# Patient Record
Sex: FEMALE | Race: WHITE | NOT HISPANIC OR LATINO | Employment: UNEMPLOYED | URBAN - METROPOLITAN AREA
[De-identification: names, ages, dates, MRNs, and addresses within clinical notes are randomized per-mention and may not be internally consistent; named-entity substitution may affect disease eponyms.]

---

## 2021-06-15 ENCOUNTER — HOSPITAL ENCOUNTER (EMERGENCY)
Facility: MEDICAL CENTER | Age: 46
End: 2021-06-15
Attending: EMERGENCY MEDICINE
Payer: MEDICAID

## 2021-06-15 VITALS
WEIGHT: 142.42 LBS | OXYGEN SATURATION: 97 % | RESPIRATION RATE: 19 BRPM | HEIGHT: 64 IN | SYSTOLIC BLOOD PRESSURE: 121 MMHG | TEMPERATURE: 99.4 F | DIASTOLIC BLOOD PRESSURE: 79 MMHG | BODY MASS INDEX: 24.31 KG/M2 | HEART RATE: 88 BPM

## 2021-06-15 DIAGNOSIS — R11.0 NAUSEA: ICD-10-CM

## 2021-06-15 DIAGNOSIS — K04.7 DENTAL INFECTION: ICD-10-CM

## 2021-06-15 PROCEDURE — 99281 EMR DPT VST MAYX REQ PHY/QHP: CPT

## 2021-06-15 RX ORDER — AMOXICILLIN 500 MG/1
500 CAPSULE ORAL 3 TIMES DAILY
Qty: 21 CAPSULE | Refills: 0 | Status: SHIPPED | OUTPATIENT
Start: 2021-06-15 | End: 2021-06-22

## 2021-06-15 RX ORDER — ONDANSETRON 4 MG/1
4 TABLET, ORALLY DISINTEGRATING ORAL EVERY 6 HOURS PRN
Qty: 10 TABLET | Refills: 0 | Status: SHIPPED | OUTPATIENT
Start: 2021-06-15 | End: 2021-06-25

## 2021-06-15 RX ORDER — ONDANSETRON 2 MG/ML
4 INJECTION INTRAMUSCULAR; INTRAVENOUS ONCE
Status: DISCONTINUED | OUTPATIENT
Start: 2021-06-15 | End: 2021-06-15 | Stop reason: HOSPADM

## 2021-06-15 ASSESSMENT — LIFESTYLE VARIABLES
DO YOU DRINK ALCOHOL: YES
HAVE YOU EVER FELT YOU SHOULD CUT DOWN ON YOUR DRINKING: NO

## 2021-06-15 NOTE — ED TRIAGE NOTES
"Nauseated Feels unwell Poss fever but not documented  \" pus pockets on gums \" Can't get into dentist till Oct 2021  "

## 2021-06-15 NOTE — DISCHARGE INSTRUCTIONS
Please follow-up with the community clinic listed above.  Call tomorrow morning to schedule follow-up appointment for a wellness check, and complete primary care visit.  Additionally, you may call them to see if they have an available dentist appointment, or follow-up with a dentist with whom he previously scheduled appointment.  Return to the emergency department if you develop any new or worsening symptoms including facial swelling, difficulty swallowing, shortness of breath, worsening nausea or vomiting, fevers, vomiting, or any further concerns.

## 2021-06-15 NOTE — ED PROVIDER NOTES
ED Provider Note    Chief Complaint:   Nausea, dental pain, upset stomach    HPI:  Ivette Jett is a very pleasant 46-year-old woman who presents to the emergency department for evaluation of dental pain and nausea.  She reports for the past year she has had intermittent pains due to poor dentition, she has multiple broken teeth, as well as teeth are full of dental caries.  Over the past 24 to 48 hours she has noticed some swelling to one of the right upper molars, as well as one of the left lower molars, she also reports some purulent drainage previously.  Swelling has improved since spontaneous drainage.  Over the past 24 to 48 hours she has felt warm, describes possible subjective fevers but did not check her temperature.  She is also had nausea without vomiting, as well as associated loss of appetite.  She does have some abdominal discomfort localized to the epigastric area, though no significant epigastric pain.  She has no chest pain, no shortness of breath, no lower abdominal pain.  She reports over the past few months her menstrual periods have gotten heavier on review of systems, with no acute changes today.  She has no dysuria, no hematuria.  She is unable to identify any exacerbating or alleviating factors.  She reports no associated facial swelling, no difficulty opening or closing the jaw.  No associated headaches or vision changes.    Review of Systems:  See HPI for pertinent positives and negatives. All other systems negative.    Past Medical History:       Social History:  Social History     Tobacco Use   • Smoking status: Current Every Day Smoker     Types: Cigarettes   • Smokeless tobacco: Never Used   Vaping Use   • Vaping Use: Never used   Substance and Sexual Activity   • Alcohol use: Not Currently     Comment: rehabing alcoholic   • Drug use: Not Currently   • Sexual activity: Not on file       Surgical History:  patient denies any surgical history    Current Medications:  Home Medications   "  **Home medications have not yet been reviewed for this encounter**         Allergies:  No Known Allergies    Physical Exam:  Vital Signs: /79   Pulse 88   Temp 37.4 °C (99.4 °F) (Temporal)   Resp 19   Ht 1.626 m (5' 4\")   Wt 64.6 kg (142 lb 6.7 oz)   LMP 05/15/2021 (Exact Date)   SpO2 97%   BMI 24.45 kg/m²   Constitutional: Alert, no acute distress  HENT: Atraumatic, no facial swelling, no trismus, right upper molar is cracked with surrounding gingival inflammation and small area of prior drainage, no active drainage or fluctuance on my exam, similar area of inflammation surrounding one of the left lower molars.  No visible nor palpable abscesses amenable to drainage on this exam.  Eyes: Pupils equal and reactive, normal conjunctiva  Neck: Supple, normal range of motion, no stridor  Cardiovascular: Extremities are warm and well perfused, no murmur appreciated, normal cardiac auscultation  Pulmonary: No respiratory distress, normal work of breathing, no accessory muscule usage, breath sounds clear and equal bilaterally  Abdomen: Soft, non-distended, non-tender to palpation, no peritoneal signs, epigastric area is nontender to palpation, no lower abdominal tenderness to palpation, no palpable masses.  Skin: Warm, dry, no rashes or lesions  Musculoskeletal: Normal range of motion in all extremities, no swelling or deformity noted  Neurologic: Alert, oriented, normal speech, normal motor function  Psychiatric: Normal and appropriate mood and affect    Medical records reviewed for continuity of care.  No prior medical records available for review.    Labs:  Labs Reviewed   CBC WITH DIFFERENTIAL   COMP METABOLIC PANEL   HCG QUAL SERUM   URINALYSIS   LIPASE       Radiology:  No orders to display        ED Medications Administered:  Medications   ondansetron (ZOFRAN) syringe/vial injection 4 mg (has no administration in time range)       Differential diagnosis:  Dental pain, odontogenic abscess, " pancreatitis, hyperglycemia    MDM:  Ms. Jett presents to the emergency department today for evaluation of dental pain, nausea, and loss of appetite.  She does report some abdominal discomfort in the epigastric area, though has no significant tenderness to palpation, nor peritoneal signs on my physical exam.  She has no evidence of airway compromise, she is easily tolerating secretions, no trismus, no facial swelling.  No evidence of facial cellulitis or abscess.    My plan was for lab work, as well as antibiotic treatment for likely dental infection, and Zofran for nausea.  However when I went to ask her about her pharmacy of choice, she was walking out of the emergency department.  She did not wait to speak to me, did not provide much notification nursing staff that she was leaving.  Unfortunately, she did not wait for discharge instructions, or an outpatient follow-up plan.    Personal protective equipment including N95 surgical respirator, goggles, and gloves were used during this encounter.       Disposition:  Eloped without notifying staff, nor waiting for MD discussion    Final Impression:  1. Dental infection    2. Nausea        Electronically signed by: Aniya Blandon MD, 6/15/2021 2:09 PM

## 2021-07-05 ENCOUNTER — HOSPITAL ENCOUNTER (EMERGENCY)
Facility: MEDICAL CENTER | Age: 46
End: 2021-07-05
Attending: EMERGENCY MEDICINE
Payer: MEDICAID

## 2021-07-05 ENCOUNTER — APPOINTMENT (OUTPATIENT)
Dept: RADIOLOGY | Facility: MEDICAL CENTER | Age: 46
End: 2021-07-05
Attending: EMERGENCY MEDICINE
Payer: MEDICAID

## 2021-07-05 VITALS
HEART RATE: 60 BPM | TEMPERATURE: 99.1 F | BODY MASS INDEX: 23.73 KG/M2 | HEIGHT: 65 IN | DIASTOLIC BLOOD PRESSURE: 66 MMHG | OXYGEN SATURATION: 100 % | RESPIRATION RATE: 16 BRPM | WEIGHT: 142.42 LBS | SYSTOLIC BLOOD PRESSURE: 127 MMHG

## 2021-07-05 DIAGNOSIS — N93.9 VAGINAL BLEEDING: ICD-10-CM

## 2021-07-05 LAB
APPEARANCE UR: CLEAR
BILIRUB UR QL STRIP.AUTO: NEGATIVE
COLOR UR: YELLOW
GLUCOSE UR STRIP.AUTO-MCNC: NEGATIVE MG/DL
HCG UR QL: NEGATIVE
KETONES UR STRIP.AUTO-MCNC: NEGATIVE MG/DL
LEUKOCYTE ESTERASE UR QL STRIP.AUTO: NEGATIVE
MICRO URNS: NORMAL
NITRITE UR QL STRIP.AUTO: NEGATIVE
PH UR STRIP.AUTO: 7 [PH] (ref 5–8)
PROT UR QL STRIP: NEGATIVE MG/DL
RBC UR QL AUTO: NEGATIVE
SP GR UR STRIP.AUTO: 1.01

## 2021-07-05 PROCEDURE — 99284 EMERGENCY DEPT VISIT MOD MDM: CPT

## 2021-07-05 PROCEDURE — 81025 URINE PREGNANCY TEST: CPT

## 2021-07-05 PROCEDURE — 81003 URINALYSIS AUTO W/O SCOPE: CPT

## 2021-07-05 PROCEDURE — 76856 US EXAM PELVIC COMPLETE: CPT

## 2021-07-05 RX ORDER — FLUOXETINE HYDROCHLORIDE 20 MG/1
20 CAPSULE ORAL DAILY
Status: SHIPPED | COMMUNITY
End: 2023-02-27

## 2021-07-05 RX ORDER — IBUPROFEN 200 MG
400 TABLET ORAL EVERY 6 HOURS PRN
Status: SHIPPED | COMMUNITY
End: 2022-11-15

## 2021-07-05 NOTE — ED NOTES
Med rec updated and complete  Allergies reviewed.  Pt reports no antibiotics in the last 30 days.      No current facility-administered medications on file prior to encounter.     Current Outpatient Medications on File Prior to Encounter   Medication Sig Dispense Refill   • FLUoxetine (PROZAC) 20 MG Cap Take 20 mg by mouth every day.     • Phenazopyridine HCl (AZO URINARY PAIN PO) Take 2 Tablets by mouth as needed (For urinary pain). For urinary pain     • multivitamin (THERAGRAN) Tab Take 1 tablet by mouth every 72 hours.     • ibuprofen (MOTRIN) 200 MG Tab Take 400 mg by mouth every 6 hours as needed for Mild Pain.

## 2021-07-05 NOTE — ED NOTES
Urine sent to lab in triage, pt co some UTI symptoms when bringing urine back from bathroom. Pt also stated she recently was released from assisted and has been unable to find healthcare yet.

## 2021-07-05 NOTE — ED TRIAGE NOTES
"Chief Complaint   Patient presents with   • Vaginal Bleeding     c/o heavier then usual bleeding and clots, fatigue, \"feverish\"; pt states she is on her monthly cycle at this time      /85   Pulse 71   Temp 37.3 °C (99.1 °F) (Temporal)   Resp 16   Ht 1.651 m (5' 5\")   Wt 64.6 kg (142 lb 6.7 oz)   SpO2 99%   BMI 23.70 kg/m²     Pt arrived w/ above concern, urine sample obtained in triage    "

## 2021-07-05 NOTE — PROGRESS NOTES
"PT REFUSING THE VAGINAL PORTION OF THE ULTRASOUND PELVIC COMPLETE, SINCE SHE SAYS SHE IS \"HEAVILY BLEEDING AND DOESN'T WANT ANYTHING UP THERE RIGHT NOW.\" RN AND PHYSICIAN AWARE. ERP SAID OK TO CHANGE TO THE TRANSABDOMINAL ONLY APPROACH. PT JUST EMPTIED BLADDER AND WILL BEGIN DRINKING FLUIDS TO FILL BLADDER AGAIN. INSTRUCTIONS GIVEN TO PT TO NOTIFY RN WHEN PT FEELS LIKE SHE REALLY HAS TO PEE, RN WILL NOTIFY ME AND WE CAN FINISH THIS EXAM.   -YOSELYN (EXT [id number])  "

## 2021-07-05 NOTE — ED PROVIDER NOTES
"ED Provider Note    CHIEF COMPLAINT  Chief Complaint   Patient presents with   • Vaginal Bleeding     c/o heavier then usual bleeding and clots, fatigue, \"feverish\"; pt states she is on her monthly cycle at this time        HPI  Ivette Jett is a 46 y.o. female here for evaluation of vaginal bleeding.  Patient states that she has her normal cycle, but her bleeding over the last couple of years is \"heavier\" than it has been.  She states that she has no fever chills, but just has felt achy over the last few days.  She has no chest pain, shortness breath, lightheadedness, or dizziness.  States she has not seen OB/GYN for the same.  She recently was incarcerated and was released.  She does have some history of urinary complaints such as frequency.  She has been taking Azo for the same.  Patient has no abdominal pain.    ROS;  Please see HPI  O/W negative     PAST MEDICAL HISTORY   No bleeding disorders    SOCIAL HISTORY  Social History     Tobacco Use   • Smoking status: Current Every Day Smoker     Types: Cigarettes   • Smokeless tobacco: Never Used   Vaping Use   • Vaping Use: Never used   Substance and Sexual Activity   • Alcohol use: Not Currently     Comment: rehabing alcoholic   • Drug use: Not Currently   • Sexual activity: Not on file       SURGICAL HISTORY  patient denies any surgical history    CURRENT MEDICATIONS  Home Medications    **Home medications have not yet been reviewed for this encounter**         ALLERGIES  No Known Allergies    REVIEW OF SYSTEMS  See HPI for further details. Review of systems as above, otherwise all other systems are negative.     PHYSICAL EXAM  VITAL SIGNS: /85   Pulse 71   Temp 37.3 °C (99.1 °F) (Temporal)   Resp 16   Ht 1.651 m (5' 5\")   Wt 64.6 kg (142 lb 6.7 oz)   SpO2 99%   BMI 23.70 kg/m²     Constitutional: Well developed, well nourished. No acute distress.  HEENT: Normocephalic, atraumatic. MMM  Neck: Supple, Full range of motion "   Chest/Pulmonary:  No respiratory distress.  Equal expansion   Musculoskeletal: No deformity, no edema, neurovascular intact.   Neuro: Clear speech, appropriate, cooperative, cranial nerves II-XII grossly intact.  Psych: Normal mood and affect        Results for orders placed or performed during the hospital encounter of 07/05/21   URINALYSIS CULTURE, IF INDICATED    Specimen: Urine   Result Value Ref Range    Color Yellow     Character Clear     Specific Gravity 1.015 <1.035    Ph 7.0 5.0 - 8.0    Glucose Negative Negative mg/dL    Ketones Negative Negative mg/dL    Protein Negative Negative mg/dL    Bilirubin Negative Negative    Nitrite Negative Negative    Leukocyte Esterase Negative Negative    Occult Blood Negative Negative    Micro Urine Req see below    HCG QUALITATIVE UR   Result Value Ref Range    Beta-Hcg Urine Negative Negative     US-PELVIC TRANSABDOMINAL ONLY   Final Result      1.  Small pocket of fluid in the fundal endometrial cavity. Early gestational sac is unlikely but possible. Please correlate with pregnancy test.   2.  The uterus is otherwise unremarkable.   3.  Normal right ovary. Nonvisualization of the left ovary.              PROCEDURES     MEDICAL RECORD  I have reviewed patient's medical record and pertinent results are listed.    COURSE & MEDICAL DECISION MAKING  I have reviewed any medical record information, laboratory studies and radiographic results as noted above.    Pt has declined the vaginal probe for u/s.  She understands we will not be able to fully evaluate her without this.     She has a reading of a small area of fluid, but her pregnancy test is negative.  She is non toxic appearing, comfortable, and will follow up.  She has  Her normal menses now, and her flow has been 'heavier' over the last 2-3 years.  This is not a new finding.     I you have had any blood pressure issues while here in the emergency department, please see your doctor for a further evaluation or work  up.    Differential diagnoses include but not limited to: normal menses, dub, pregnancy.     This patient presents with vaginal bleeding .  At this time, I have counseled the patient/family regarding their medications, pain control, and follow up.  They will continue their medications, if any, as prescribed.  They will return immediately for any worsening symptoms and/or any other medical concerns.  They will see their doctor, or contact the doctor provided, in 1-2 days for follow up.       FINAL IMPRESSION  1. Vaginal bleeding             Electronically signed by: Oleg Justice D.O., 7/5/2021 10:40 AM

## 2022-07-14 ENCOUNTER — OFFICE VISIT (OUTPATIENT)
Dept: URGENT CARE | Facility: CLINIC | Age: 47
End: 2022-07-14
Payer: MEDICAID

## 2022-07-14 VITALS
TEMPERATURE: 98 F | HEIGHT: 65 IN | WEIGHT: 123 LBS | OXYGEN SATURATION: 100 % | RESPIRATION RATE: 18 BRPM | BODY MASS INDEX: 20.49 KG/M2 | HEART RATE: 90 BPM | SYSTOLIC BLOOD PRESSURE: 130 MMHG | DIASTOLIC BLOOD PRESSURE: 70 MMHG

## 2022-07-14 DIAGNOSIS — K04.7 DENTAL INFECTION: ICD-10-CM

## 2022-07-14 DIAGNOSIS — R11.0 NAUSEA: ICD-10-CM

## 2022-07-14 DIAGNOSIS — R10.9 ABDOMINAL DISCOMFORT: ICD-10-CM

## 2022-07-14 LAB

## 2022-07-14 PROCEDURE — 81025 URINE PREGNANCY TEST: CPT | Performed by: PHYSICIAN ASSISTANT

## 2022-07-14 PROCEDURE — 81002 URINALYSIS NONAUTO W/O SCOPE: CPT | Performed by: PHYSICIAN ASSISTANT

## 2022-07-14 PROCEDURE — 99203 OFFICE O/P NEW LOW 30 MIN: CPT | Performed by: PHYSICIAN ASSISTANT

## 2022-07-14 RX ORDER — AMOXICILLIN AND CLAVULANATE POTASSIUM 875; 125 MG/1; MG/1
1 TABLET, FILM COATED ORAL 2 TIMES DAILY
Qty: 14 TABLET | Refills: 0 | Status: SHIPPED | OUTPATIENT
Start: 2022-07-14 | End: 2022-07-21

## 2022-07-14 ASSESSMENT — ENCOUNTER SYMPTOMS
SHORTNESS OF BREATH: 0
COUGH: 1
EYE REDNESS: 0
FEVER: 0
ABDOMINAL PAIN: 1
SORE THROAT: 0
DIARRHEA: 1
VOMITING: 0
EYE DISCHARGE: 0
NAUSEA: 1

## 2022-07-14 NOTE — PROGRESS NOTES
"Subjective     Ivette Jett is a 47 y.o. female who presents with Cough (Nasal congestion/lower abdominal discomfort/teeth pain/x 1 month)        This is a new problem.  The patient presents to clinic with multiple complaints.  The patient states she has been experiencing intermittent lower abdominal discomfort x1.5 months.  The patient states her lower abdomen feels \"swollen and bloated\" at times.  The patient states this is currently improved.  The patient states that she is often nauseous in the morning, stating she \"feels sick\".  The patient reports no associated vomiting.  The patient states that she is experiencing some loose stools, but reports no diarrhea.  The patient states that she has a daily bowel movement.  Patient reports no associated bloody stools.  The patient states it feels like she may have a UTI.  The patient reports no associated dysuria.  She also reports no hematuria.  The patient denies abnormal vaginal discharge or a vaginal odor.  The patient admits to increased alcohol consumption.  The patient states that she is drinking a pint of whiskey every day.  The patient states she is also experiencing ongoing dental infection with multiple abscessed teeth.  The patient states she has not been able to get into a dentist.  The patient reports no associated fever.  The patient states that she has not had a menstrual period in 2 months.  The patient states her last menstrual period was May 2022.  The patient is unsure if this is related to increased stress, as she is currently homeless and living in a shelter.  However, the patient is concerned about possible pregnancy.  The patient has not taken any OTC medications for her current symptoms.  The patient states she has not been on antibiotics recently for her ongoing dental infection.  She reports no recent sick contacts.    URI   Associated symptoms include abdominal pain (Patient reports intermittent lower abdominal pain with " "associated bloating.), coughing (The patient reports a chronic cough, which she attributes to smoking.), diarrhea and nausea. Pertinent negatives include no chest pain, congestion, ear pain, sore throat or vomiting.     PMH:  has no past medical history on file.  MEDS:   Current Outpatient Medications:   •  FLUoxetine (PROZAC) 20 MG Cap, Take 20 mg by mouth every day. (Patient not taking: Reported on 7/14/2022), Disp: , Rfl:   •  Phenazopyridine HCl (AZO URINARY PAIN PO), Take 2 Tablets by mouth as needed (For urinary pain). For urinary pain (Patient not taking: Reported on 7/14/2022), Disp: , Rfl:   •  multivitamin (THERAGRAN) Tab, Take 1 tablet by mouth every 72 hours. (Patient not taking: Reported on 7/14/2022), Disp: , Rfl:   •  ibuprofen (MOTRIN) 200 MG Tab, Take 400 mg by mouth every 6 hours as needed for Mild Pain. (Patient not taking: Reported on 7/14/2022), Disp: , Rfl:   ALLERGIES: No Known Allergies  SURGHX: No past surgical history on file.  SOCHX:  reports that she has been smoking cigarettes. She has never used smokeless tobacco. She reports current alcohol use. She reports previous drug use.  FH: Family history was reviewed, no pertinent findings to report      Review of Systems   Constitutional: Negative for fever.   HENT: Negative for congestion, ear pain and sore throat.    Eyes: Negative for discharge and redness.   Respiratory: Positive for cough (The patient reports a chronic cough, which she attributes to smoking.). Negative for shortness of breath.    Cardiovascular: Negative for chest pain.   Gastrointestinal: Positive for abdominal pain (Patient reports intermittent lower abdominal pain with associated bloating.), diarrhea and nausea. Negative for vomiting.              Objective     /70 (BP Location: Left arm, Patient Position: Sitting)   Pulse 90   Temp 36.7 °C (98 °F) (Temporal)   Resp 18   Ht 1.638 m (5' 4.5\")   Wt 55.8 kg (123 lb)   LMP 05/15/2022 (Approximate)   SpO2 100%  "  BMI 20.79 kg/m²      Physical Exam  Constitutional:       General: She is not in acute distress.     Appearance: Normal appearance. She is well-developed. She is not ill-appearing.   HENT:      Head: Normocephalic and atraumatic.      Right Ear: External ear normal.      Left Ear: External ear normal.      Nose: Nose normal.      Mouth/Throat:      Mouth: Mucous membranes are moist.      Dentition: Abnormal dentition. Dental tenderness and dental caries present. No gingival swelling or dental abscesses.      Pharynx: Oropharynx is clear. No posterior oropharyngeal erythema.      Comments:   The patient's dentition was abnormal with multiple missing/broken teeth and dental caries.  Eyes:      Extraocular Movements: Extraocular movements intact.      Conjunctiva/sclera: Conjunctivae normal.   Cardiovascular:      Rate and Rhythm: Normal rate and regular rhythm.      Heart sounds: Normal heart sounds.   Pulmonary:      Effort: Pulmonary effort is normal. No respiratory distress.      Breath sounds: Normal breath sounds. No wheezing.   Abdominal:      General: Bowel sounds are normal.      Palpations: Abdomen is soft.      Tenderness: There is no abdominal tenderness. There is no right CVA tenderness or left CVA tenderness.   Musculoskeletal:         General: Normal range of motion.      Cervical back: Normal range of motion and neck supple.   Skin:     General: Skin is warm and dry.   Neurological:      Mental Status: She is alert and oriented to person, place, and time.            Progress:  Results for orders placed or performed in visit on 07/14/22   POCT Urinalysis   Result Value Ref Range    POC Color YELLOW Negative    POC Appearance CLEAR Negative    POC Leukocyte Esterase NEGATIVE Negative    POC Nitrites NEGATIVE Negative    POC Urobiligen 0.2 Negative (0.2) mg/dL    POC Protein NEGATIVE Negative mg/dL    POC Urine PH 6.0 5.0 - 8.0    POC Blood NEGATIVE Negative    POC Specific Gravity 1.025 <1.005 - >1.030     POC Ketones NEGATIVE Negative mg/dL    POC Bilirubin NEGATIVE Negative mg/dL    POC Glucose NEGATIVE Negative mg/dL   POCT Pregnancy   Result Value Ref Range    POC Urine Pregnancy Test NEGATIVE Negative    Internal Control Positive Valid     Internal Control Negative Valid                    Assessment & Plan          MDM:  The patient's presenting symptoms and physical exam findings are consistent with abdominal discomfort with associated nausea.  The patient is also experiencing signs and symptoms of a dental infection.  On physical exam, the patient's dentition was abnormal with multiple missing/broken teeth and dental caries.  The patient had no gingival swelling or pinpoint dental abscess.  The remainder the patient's physical exam today in clinic was normal.  The patient's abdomen was soft and nontender with normal bowel sounds.  The patient is nontoxic and appears in no acute distress.  The patient's vital signs are stable and within normal limits.  She is afebrile today in clinic.  The patient's POCT urinalysis today in clinic was normal with negative leukocyte esterase, negative blood, and negative nitrites.  The patient's POCT pregnancy test was also normal.  The cause of the patient's intermittent lower abdominal pain is unknown at this time.  Based on the patient's presenting symptoms and physical exam findings, I have low clinical suspicion for an acute abdominal process.  Informed the patient that her GI symptoms could be related to her increased alcohol consumption.  Advised the patient to monitor worsening signs and or symptoms.  Will prescribe patient Augmentin for her ongoing dental infection.  Recommend OTC medications and supportive care for symptomatic management.  Recommend the patient follow-up with her PCP as needed.  Discussed return precautions with the patient, and she verbalized understanding.      1. Abdominal discomfort  - POCT Urinalysis  - POCT Pregnancy    2.  Nausea    Differential diagnoses, supportive care, and indications for immediate follow-up discussed with patient.   Instructed to return to clinic or nearest emergency department for any change in condition, further concerns, or worsening of symptoms.    OTC Tylenol or Motrin for fever/discomfort.  Drink plenty of fluids  Follow-up with PCP  Return to clinic or go to the ED if symptoms worsen or fail to improve, or if the patient should develop worsening/increasing abdominal pain, nausea, vomiting, diarrhea, urinary symptoms, hematuria, flank pain, fever/chills, and/or any concerning symptoms.      3. Dental infection  - amoxicillin-clavulanate (AUGMENTIN) 875-125 MG Tab; Take 1 Tablet by mouth 2 times a day for 7 days.  Dispense: 14 Tablet; Refill: 0    Differential diagnoses, supportive care, and indications for immediate follow-up discussed with patient.   Instructed to return to clinic or nearest emergency department for any change in condition, further concerns, or worsening of symptoms.    OTC Tylenol or Motrin for fever/discomfort.  Ice  Drink plenty of fluids  Follow-up with dentist as soon as possible  Return to clinic or go to the ED if symptoms worsen or fail to improve, or if the patient should develop worsening/increasing dental pain, facial swelling, redness or warmth to the affected area, difficulty swallowing, shortness of breath, fever/chills, and/or any concerning symptoms.      Discussed plan with the patient, and she agrees to the above.     I personally reviewed prior external notes and test results pertinent to today's visit.  I have independently reviewed and interpreted all diagnostics ordered during this urgent care visit.       Please note that this dictation was created using voice recognition software. I have made every reasonable attempt to correct obvious errors, but I expect that there may be errors of grammar and possibly content that I did not discover before finalizing the note.      This note was electronically signed by Diana Mcleod PA-C

## 2022-10-16 ENCOUNTER — HOSPITAL ENCOUNTER (EMERGENCY)
Facility: MEDICAL CENTER | Age: 47
End: 2022-10-16
Attending: EMERGENCY MEDICINE
Payer: MEDICAID

## 2022-10-16 VITALS
HEART RATE: 89 BPM | RESPIRATION RATE: 16 BRPM | DIASTOLIC BLOOD PRESSURE: 88 MMHG | OXYGEN SATURATION: 94 % | WEIGHT: 120.59 LBS | SYSTOLIC BLOOD PRESSURE: 138 MMHG | TEMPERATURE: 99.3 F | HEIGHT: 64 IN | BODY MASS INDEX: 20.59 KG/M2

## 2022-10-16 DIAGNOSIS — J40 BRONCHITIS: ICD-10-CM

## 2022-10-16 DIAGNOSIS — K08.89 PAIN, DENTAL: ICD-10-CM

## 2022-10-16 PROCEDURE — 99282 EMERGENCY DEPT VISIT SF MDM: CPT

## 2022-10-16 RX ORDER — AMOXICILLIN 500 MG/1
500 CAPSULE ORAL 3 TIMES DAILY
Qty: 21 CAPSULE | Refills: 0 | Status: SHIPPED | OUTPATIENT
Start: 2022-10-16 | End: 2022-10-23

## 2022-10-17 NOTE — ED NOTES
Pt provided verbal and written dc instructions, vss, no piv, pt ambulated out of ed independently.

## 2022-10-17 NOTE — ED TRIAGE NOTES
Ivette Jett  47 y.o.  female  Chief Complaint   Patient presents with    Cough     C/o cough with yellow phlegm, off & on for several weeks. + fatigue. No fevers. Chills or shortness of breath.    Dental Pain     Pt has several teeth needing extraction, increased pain x 1 month. Difficulty getting a soon enough dentist appt - 3-6 months out. Tried getting appt with [facility] for abx but next appt in November.       Patient educated on triage process, to alert staff if any changes in condition.

## 2022-11-08 ENCOUNTER — HOSPITAL ENCOUNTER (EMERGENCY)
Facility: MEDICAL CENTER | Age: 47
End: 2022-11-08
Attending: STUDENT IN AN ORGANIZED HEALTH CARE EDUCATION/TRAINING PROGRAM
Payer: MEDICAID

## 2022-11-08 VITALS
HEIGHT: 64 IN | HEART RATE: 91 BPM | TEMPERATURE: 97.8 F | WEIGHT: 134.04 LBS | SYSTOLIC BLOOD PRESSURE: 125 MMHG | OXYGEN SATURATION: 99 % | DIASTOLIC BLOOD PRESSURE: 90 MMHG | RESPIRATION RATE: 16 BRPM | BODY MASS INDEX: 22.88 KG/M2

## 2022-11-08 DIAGNOSIS — J06.9 UPPER RESPIRATORY TRACT INFECTION, UNSPECIFIED TYPE: ICD-10-CM

## 2022-11-08 DIAGNOSIS — K02.9 DENTAL CARIES: ICD-10-CM

## 2022-11-08 LAB
FLUAV RNA SPEC QL NAA+PROBE: NEGATIVE
FLUBV RNA SPEC QL NAA+PROBE: NEGATIVE
HCG UR QL: NEGATIVE
RSV RNA SPEC QL NAA+PROBE: NEGATIVE
SARS-COV-2 RNA RESP QL NAA+PROBE: NOTDETECTED
SPECIMEN SOURCE: NORMAL

## 2022-11-08 PROCEDURE — A9270 NON-COVERED ITEM OR SERVICE: HCPCS | Performed by: STUDENT IN AN ORGANIZED HEALTH CARE EDUCATION/TRAINING PROGRAM

## 2022-11-08 PROCEDURE — C9803 HOPD COVID-19 SPEC COLLECT: HCPCS | Performed by: STUDENT IN AN ORGANIZED HEALTH CARE EDUCATION/TRAINING PROGRAM

## 2022-11-08 PROCEDURE — C9803 HOPD COVID-19 SPEC COLLECT: HCPCS

## 2022-11-08 PROCEDURE — 99283 EMERGENCY DEPT VISIT LOW MDM: CPT

## 2022-11-08 PROCEDURE — 0241U HCHG SARS-COV-2 COVID-19 NFCT DS RESP RNA 4 TRGT MIC: CPT

## 2022-11-08 PROCEDURE — 700102 HCHG RX REV CODE 250 W/ 637 OVERRIDE(OP): Performed by: STUDENT IN AN ORGANIZED HEALTH CARE EDUCATION/TRAINING PROGRAM

## 2022-11-08 PROCEDURE — 81025 URINE PREGNANCY TEST: CPT

## 2022-11-08 RX ORDER — NAPROXEN 500 MG/1
500 TABLET ORAL ONCE
Status: COMPLETED | OUTPATIENT
Start: 2022-11-08 | End: 2022-11-08

## 2022-11-08 RX ORDER — ACETAMINOPHEN 500 MG
1000 TABLET ORAL ONCE
Status: COMPLETED | OUTPATIENT
Start: 2022-11-08 | End: 2022-11-08

## 2022-11-08 RX ORDER — PENICILLIN V POTASSIUM 500 MG/1
500 TABLET ORAL 4 TIMES DAILY
Qty: 28 TABLET | Refills: 0 | Status: SHIPPED | OUTPATIENT
Start: 2022-11-08 | End: 2022-11-15

## 2022-11-08 RX ADMIN — ACETAMINOPHEN 1000 MG: 500 TABLET ORAL at 03:49

## 2022-11-08 RX ADMIN — NAPROXEN 500 MG: 500 TABLET ORAL at 04:09

## 2022-11-08 ASSESSMENT — ENCOUNTER SYMPTOMS
VOMITING: 0
FEVER: 1
CHILLS: 1
FALLS: 0
MYALGIAS: 1
SORE THROAT: 0
SHORTNESS OF BREATH: 0
NAUSEA: 0
NECK PAIN: 0
LOSS OF CONSCIOUSNESS: 0
HEADACHES: 0
BLURRED VISION: 0
ABDOMINAL PAIN: 0
COUGH: 1
DOUBLE VISION: 0

## 2022-11-08 NOTE — ED TRIAGE NOTES
Ivettedenisse Britton Johnie  47 y.o. female    Chief Complaint   Patient presents with    Dental Pain           Flu Like Symptoms     Pt arrives with complaints of dental pain and swelling- pt states she dropped her abx in water last month and was unable to finish course.     Pt also complains of cough, body aches, fatigue for past 24 hours.   Vitals:    11/08/22 0149   BP: (!) 127/93   Pulse: 89   Resp: 16   Temp: 36.6 °C (97.9 °F)   SpO2: 99%       Triage process explained to patient, apologized for wait time, and returned to lobby.  Pt informed to notify staff of any change in condition.

## 2022-11-08 NOTE — ED PROVIDER NOTES
"ED Provider Note    Chief Complaint:   Dental pain    HPI:  Ivette Jett is a very pleasant 47-year-old female with past medical history of dental caries who presents with persistent dental pain after misplacing her antibiotics last month.  Patient also complains of 3 to 4 days of fevers, chills, body aches, rhinorrhea, cough.  Patient denies difficulty breathing or swallowing.Patient denies neck pain, altered mental status.    Review of Systems:  Review of Systems   Constitutional:  Positive for chills and fever.   HENT:  Positive for congestion. Negative for sore throat.    Eyes:  Negative for blurred vision and double vision.   Respiratory:  Positive for cough. Negative for shortness of breath.    Cardiovascular:  Negative for chest pain and leg swelling.   Gastrointestinal:  Negative for abdominal pain, nausea and vomiting.   Genitourinary:  Negative for dysuria and hematuria.   Musculoskeletal:  Positive for myalgias. Negative for falls and neck pain.   Skin:  Negative for itching and rash.   Neurological:  Negative for loss of consciousness and headaches.     Family History:  History reviewed. No pertinent family history.    Past Medical History:       Social History:  Social History     Tobacco Use    Smoking status: Every Day     Types: Cigarettes    Smokeless tobacco: Never   Vaping Use    Vaping Use: Never used   Substance and Sexual Activity    Alcohol use: Yes     Comment: [facility]    Drug use: Not Currently    Sexual activity: Not on file       Surgical History:  patient denies any surgical history    Allergies:  No Known Allergies    Physical Exam:  Vital Signs: BP (!) 127/93   Pulse 89   Temp 36.6 °C (97.9 °F) (Temporal)   Resp 16   Ht 1.626 m (5' 4\")   Wt 60.8 kg (134 lb 0.6 oz)   SpO2 99%   BMI 23.01 kg/m²   Physical Exam  Constitutional:       Appearance: She is not toxic-appearing.   HENT:      Head: Normocephalic and atraumatic.      Mouth/Throat:      Comments: Patient has severe " dental caries diffusely, no evidence of posterior oropharyngeal erythema, swelling, exudates.  No sublingual elevation.  No fluctuance, no erythema.  Pulmonary:      Effort: Pulmonary effort is normal. No respiratory distress.   Skin:     General: Skin is warm and dry.   Neurological:      Mental Status: She is alert.       Medical records reviewed for continuity of care.     Results for orders placed or performed during the hospital encounter of 11/08/22   CoV-2, FLU A/B, and RSV by PCR (2-4 Hours CEPHEID) : Collect NP swab in VTM    Specimen: Nasopharyngeal; Respirate   Result Value Ref Range    Influenza virus A RNA Negative Negative    Influenza virus B, PCR Negative Negative    RSV, PCR Negative Negative    SARS-CoV-2 by PCR NotDetected     SARS-CoV-2 Source NP Swab    HCG QUALITATIVE UR   Result Value Ref Range    Beta-Hcg Urine Negative Negative       Radiology:  No orders to display        MDM:  Patient resenting with sinusitis consistent of viral upper respiratory infection.  No evidence of pneumonia, afebrile, tachycardia cardia is absent, no hypoxemia, no tachypnea.  No respiratory distress.  PCR negative for COVID, flu, RSV.  hCG is negative.  Tylenol and Naprosyn for symptom control of body aches.  Patient will be represcribed antibiotics for dental caries and given information for the [place] clinic to follow-up for necessary dental services.    Electronically signed by: Levi Acosta M.D., 11/8/2022, 4:07 AM      Disposition:  Home    Final Impression:  1. Dental caries    2. Upper respiratory tract infection, unspecified type        Electronically signed by: Levi Acosta M.D., 11/8/2022 4:10 AM

## 2022-11-08 NOTE — ED NOTES
Patient ambulated from lobby to room independently with steady gait.  COVID swab sent in triage.  Chart up for ERP.

## 2022-11-15 ENCOUNTER — HOSPITAL ENCOUNTER (EMERGENCY)
Facility: MEDICAL CENTER | Age: 47
End: 2022-11-15
Attending: EMERGENCY MEDICINE
Payer: MEDICAID

## 2022-11-15 ENCOUNTER — APPOINTMENT (OUTPATIENT)
Dept: URGENT CARE | Facility: CLINIC | Age: 47
End: 2022-11-15
Payer: MEDICAID

## 2022-11-15 VITALS
BODY MASS INDEX: 22.78 KG/M2 | WEIGHT: 132.72 LBS | DIASTOLIC BLOOD PRESSURE: 88 MMHG | TEMPERATURE: 98.2 F | HEART RATE: 88 BPM | OXYGEN SATURATION: 99 % | SYSTOLIC BLOOD PRESSURE: 144 MMHG | RESPIRATION RATE: 15 BRPM

## 2022-11-15 DIAGNOSIS — Z76.0 MEDICATION REFILL: ICD-10-CM

## 2022-11-15 DIAGNOSIS — R05.9 COUGH, UNSPECIFIED TYPE: ICD-10-CM

## 2022-11-15 DIAGNOSIS — K02.9 DENTAL CARIES: ICD-10-CM

## 2022-11-15 PROCEDURE — 700102 HCHG RX REV CODE 250 W/ 637 OVERRIDE(OP): Performed by: EMERGENCY MEDICINE

## 2022-11-15 PROCEDURE — 99283 EMERGENCY DEPT VISIT LOW MDM: CPT

## 2022-11-15 PROCEDURE — A9270 NON-COVERED ITEM OR SERVICE: HCPCS | Performed by: EMERGENCY MEDICINE

## 2022-11-15 RX ORDER — DEXTROMETHORPHAN POLISTIREX 30 MG/5ML
60 SUSPENSION ORAL 2 TIMES DAILY
Qty: 280 ML | Refills: 0 | Status: SHIPPED | OUTPATIENT
Start: 2022-11-15 | End: 2023-02-27

## 2022-11-15 RX ORDER — PENICILLIN V POTASSIUM 500 MG/1
500 TABLET ORAL ONCE
Status: COMPLETED | OUTPATIENT
Start: 2022-11-15 | End: 2022-11-15

## 2022-11-15 RX ORDER — IBUPROFEN 600 MG/1
600 TABLET ORAL ONCE
Status: COMPLETED | OUTPATIENT
Start: 2022-11-15 | End: 2022-11-15

## 2022-11-15 RX ORDER — IBUPROFEN 600 MG/1
600 TABLET ORAL EVERY 8 HOURS PRN
Qty: 30 TABLET | Refills: 0 | Status: SHIPPED | OUTPATIENT
Start: 2022-11-15 | End: 2023-02-27

## 2022-11-15 RX ORDER — ACETAMINOPHEN, DEXTROMETHORPHAN HYDROBROMIDE, AND PHENYLEPHRINE HYDROCHLORIDE 325; 10; 5 MG/1; MG/1; MG/1
1 CAPSULE, LIQUID FILLED ORAL DAILY
Qty: 20 CAPSULE | Refills: 0 | Status: SHIPPED | OUTPATIENT
Start: 2022-11-15 | End: 2023-02-27

## 2022-11-15 RX ORDER — PENICILLIN V POTASSIUM 500 MG/1
500 TABLET ORAL 4 TIMES DAILY
Qty: 28 TABLET | Refills: 0 | Status: SHIPPED | OUTPATIENT
Start: 2022-11-15 | End: 2022-11-22

## 2022-11-15 RX ORDER — GUAIFENESIN/DEXTROMETHORPHAN 100-10MG/5
10 SYRUP ORAL ONCE
Status: COMPLETED | OUTPATIENT
Start: 2022-11-15 | End: 2022-11-15

## 2022-11-15 RX ADMIN — GUAIFENESIN SYRUP AND DEXTROMETHORPHAN 10 ML: 100; 10 SYRUP ORAL at 21:22

## 2022-11-15 RX ADMIN — IBUPROFEN 600 MG: 600 TABLET, FILM COATED ORAL at 21:22

## 2022-11-15 RX ADMIN — PENICILLIN V POTASSIUM 500 MG: 500 TABLET, FILM COATED ORAL at 21:22

## 2022-11-15 ASSESSMENT — ENCOUNTER SYMPTOMS: COUGH: 1

## 2022-11-16 NOTE — ED NOTES
Pt discharged I stable condition and ambulates to lobby with a steady gait. No needs at this time and No IV. Pt aware how to  and take Rx.

## 2022-11-16 NOTE — ED PROVIDER NOTES
ED Provider Note    Primary care provider: Pcp Pt States None  Means of arrival: private vehicle  History obtained from: patient  History limited by: none    CHIEF COMPLAINT  Chief Complaint   Patient presents with    Medication Refill     Lost her abx for dental infection.  Needs one days worth       HPI  Ivette Jett is a 47 y.o. female who presents to the Emergency Department for evaluation of medication refill.  Over the last month this is patient's third visit to the emergency department for dental caries and dental pain.  She reports that she has again lost approximately half of her prescription for her penicillin.  She reports that the penicillin was helping her dental caries but then she lost it.  She also reports having a mild cough, denies fevers or chills.  Is requesting cough medication.  Per record review she was last seen 1 week ago for similar complaints.  She was treated with penicillin.  She had COVID RSV and flu test that were done which were negative.    REVIEW OF SYSTEMS  Review of Systems   HENT:          Positive for dental pain   Respiratory:  Positive for cough.        PAST MEDICAL HISTORY  None    SURGICAL HISTORY  patient denies any surgical history    SOCIAL HISTORY  Social History     Tobacco Use    Smoking status: Every Day     Types: Cigarettes    Smokeless tobacco: Never   Vaping Use    Vaping Use: Never used   Substance Use Topics    Alcohol use: Yes     Comment: acc    Drug use: Not Currently      Social History     Substance and Sexual Activity   Drug Use Not Currently       FAMILY HISTORY  none    CURRENT MEDICATIONS  None    ALLERGIES  No Known Allergies    PHYSICAL EXAM  VITAL SIGNS: BP (!) 145/97   Pulse 96   Temp 36.8 °C (98.2 °F) (Temporal)   Resp 16   Wt 60.2 kg (132 lb 11.5 oz)   SpO2 100%   BMI 22.78 kg/m²   Vitals reviewed by myself.  Physical Exam  Nursing note and vitals reviewed.  Constitutional: Well-developed and well-nourished. No acute distress.    HENT: Head is normocephalic and atraumatic.  Poor dentition  Eyes: extra-ocular movements intact  Cardiovascular: regular rate and regular rhythm. No murmur heard.  Pulmonary/Chest: Breath sounds normal. No wheezes or rales.  No rhonchi  Abdominal: Soft and non-tender. No distention.    Musculoskeletal: Extremities exhibit normal range of motion without edema or tenderness.   Neurological: Awake and alert  Skin: Skin is warm and dry. No rash.         DIAGNOSTIC STUDIES /  LABS  None    COURSE & MEDICAL DECISION MAKING  Nursing notes, VS, PMSFHx reviewed in chart.    Patient is a 47-year-old female who comes in for evaluation of medication refill.  On physical exam patient is well-appearing with vitals in normal limits.  She has poor dentition and I will prescribe her penicillin again for likely dental caries, she is advised she needs to follow-up with a dentist.  Clinically she is well-appearing, she is not hypoxic and lungs are clear to auscultation therefore I do not believe she has pneumonia.  She likely has a viral URI versus cough related to smoking.  She will be started on Robitussin for cough.  She is also requesting prescription for Motrin which is provided to her.  She is then given strict return precautions and discharged in stable condition.      FINAL IMPRESSION  1. Medication refill    2. Cough, unspecified type    3. Dental caries

## 2022-11-16 NOTE — ED TRIAGE NOTES
Chief Complaint   Patient presents with    Medication Refill     Lost her abx for dental infection.  Needs one days worth

## 2023-01-17 ENCOUNTER — HOSPITAL ENCOUNTER (EMERGENCY)
Facility: MEDICAL CENTER | Age: 48
End: 2023-01-17
Attending: STUDENT IN AN ORGANIZED HEALTH CARE EDUCATION/TRAINING PROGRAM
Payer: COMMERCIAL

## 2023-01-17 VITALS
TEMPERATURE: 97.5 F | OXYGEN SATURATION: 99 % | SYSTOLIC BLOOD PRESSURE: 145 MMHG | RESPIRATION RATE: 16 BRPM | BODY MASS INDEX: 22.39 KG/M2 | HEIGHT: 64 IN | HEART RATE: 84 BPM | DIASTOLIC BLOOD PRESSURE: 81 MMHG | WEIGHT: 131.17 LBS

## 2023-01-17 DIAGNOSIS — N93.8 DYSFUNCTIONAL UTERINE BLEEDING: ICD-10-CM

## 2023-01-17 DIAGNOSIS — K02.9 DENTAL CARIES: ICD-10-CM

## 2023-01-17 LAB — HCG UR QL: NEGATIVE

## 2023-01-17 PROCEDURE — 99284 EMERGENCY DEPT VISIT MOD MDM: CPT

## 2023-01-17 PROCEDURE — 81025 URINE PREGNANCY TEST: CPT

## 2023-01-17 PROCEDURE — A9270 NON-COVERED ITEM OR SERVICE: HCPCS | Performed by: STUDENT IN AN ORGANIZED HEALTH CARE EDUCATION/TRAINING PROGRAM

## 2023-01-17 PROCEDURE — 700102 HCHG RX REV CODE 250 W/ 637 OVERRIDE(OP): Performed by: STUDENT IN AN ORGANIZED HEALTH CARE EDUCATION/TRAINING PROGRAM

## 2023-01-17 RX ORDER — PENICILLIN V POTASSIUM 500 MG/1
500 TABLET ORAL 4 TIMES DAILY
Qty: 20 TABLET | Refills: 0 | Status: ACTIVE | OUTPATIENT
Start: 2023-01-17 | End: 2023-01-22

## 2023-01-17 RX ORDER — IBUPROFEN 600 MG/1
600 TABLET ORAL ONCE
Status: COMPLETED | OUTPATIENT
Start: 2023-01-17 | End: 2023-01-17

## 2023-01-17 RX ORDER — ACETAMINOPHEN 325 MG/1
650 TABLET ORAL ONCE
Status: COMPLETED | OUTPATIENT
Start: 2023-01-17 | End: 2023-01-17

## 2023-01-17 RX ADMIN — IBUPROFEN 600 MG: 600 TABLET, FILM COATED ORAL at 22:26

## 2023-01-17 RX ADMIN — ACETAMINOPHEN 650 MG: 325 TABLET ORAL at 22:26

## 2023-01-17 ASSESSMENT — PAIN DESCRIPTION - PAIN TYPE: TYPE: ACUTE PAIN

## 2023-01-18 NOTE — DISCHARGE INSTRUCTIONS
Return if you feel lightheaded, dizzy, have worsening pain or any new or acute concern . Please follow-up with dentist

## 2023-01-18 NOTE — ED TRIAGE NOTES
Ivette Jett  47 y.o.  Chief Complaint   Patient presents with    Vaginal Bleeding    Dental Pain     Ambulatory with steady gait for above, pt reports no period for last couple months, now has noticed some blood. Pt also reporting dental pain, has not been able to see a dentist. A&Ox4, NAD, speaking in full sentences, maintaining secretions, placed back in lobby.

## 2023-02-27 ENCOUNTER — OFFICE VISIT (OUTPATIENT)
Dept: URGENT CARE | Facility: CLINIC | Age: 48
End: 2023-02-27
Payer: COMMERCIAL

## 2023-02-27 VITALS
HEART RATE: 89 BPM | HEIGHT: 64 IN | RESPIRATION RATE: 18 BRPM | SYSTOLIC BLOOD PRESSURE: 130 MMHG | BODY MASS INDEX: 21.34 KG/M2 | TEMPERATURE: 98.2 F | WEIGHT: 125 LBS | DIASTOLIC BLOOD PRESSURE: 80 MMHG | OXYGEN SATURATION: 97 %

## 2023-02-27 DIAGNOSIS — K04.7 DENTAL INFECTION: ICD-10-CM

## 2023-02-27 PROCEDURE — 99213 OFFICE O/P EST LOW 20 MIN: CPT | Performed by: FAMILY MEDICINE

## 2023-02-27 RX ORDER — AMOXICILLIN AND CLAVULANATE POTASSIUM 875; 125 MG/1; MG/1
1 TABLET, FILM COATED ORAL 2 TIMES DAILY
Qty: 14 TABLET | Refills: 0 | Status: SHIPPED | OUTPATIENT
Start: 2023-02-27 | End: 2023-03-06

## 2023-02-27 NOTE — PROGRESS NOTES
"  Subjective:      48 y.o. female presents to urgent care for concerns of an infection to her teeth.  Yesterday she developed pain to her left upper and bottom jaw.  There is no inciting event or trauma at that time.  The pain is constant, is described as throbbing, currently rated 8/10.  She has not tried any medication for this.  Has not seen a dentist for 2 years.    She denies any other questions or concerns at this time.    Current problem list, medication, and past medical/surgical history were reviewed in Epic.    ROS  See HPI     Objective:      /80   Pulse 89   Temp 36.8 °C (98.2 °F) (Temporal)   Resp 18   Ht 1.626 m (5' 4\")   Wt 56.7 kg (125 lb)   LMP 02/01/2023   SpO2 97%   BMI 21.46 kg/m²     Physical Exam  Constitutional:       General: She is not in acute distress.     Appearance: She is not diaphoretic.   HENT:      Mouth/Throat:      Mouth: Oral lesions present.      Dentition: Abnormal dentition. Dental tenderness and gum lesions present.   Cardiovascular:      Rate and Rhythm: Normal rate and regular rhythm.      Heart sounds: Normal heart sounds.   Pulmonary:      Effort: Pulmonary effort is normal. No respiratory distress.      Breath sounds: Normal breath sounds.   Neurological:      Mental Status: She is alert.   Psychiatric:         Mood and Affect: Affect normal.         Judgment: Judgment normal.     Assessment/Plan:     1. Dental infection  Prescription for Augmentin has been sent.  She was highly encouraged to follow-up with a dentist.  Referral to establish care with PCP has been placed.  - amoxicillin-clavulanate (AUGMENTIN) 875-125 MG Tab; Take 1 Tablet by mouth 2 times a day for 7 days.  Dispense: 14 Tablet; Refill: 0  - Referral to establish with [facility] PCP      Instructed to return to Urgent Care or nearest Emergency Department if symptoms fail to improve, for any change in condition, further concerns, or new concerning symptoms. Patient states understanding of the plan " of care and discharge instructions.    Elise Bach M.D.

## 2023-05-26 ENCOUNTER — HOSPITAL ENCOUNTER (EMERGENCY)
Facility: MEDICAL CENTER | Age: 48
End: 2023-05-26
Attending: EMERGENCY MEDICINE
Payer: COMMERCIAL

## 2023-05-26 ENCOUNTER — APPOINTMENT (OUTPATIENT)
Dept: RADIOLOGY | Facility: MEDICAL CENTER | Age: 48
End: 2023-05-26
Attending: EMERGENCY MEDICINE
Payer: COMMERCIAL

## 2023-05-26 ENCOUNTER — OFFICE VISIT (OUTPATIENT)
Dept: URGENT CARE | Facility: CLINIC | Age: 48
End: 2023-05-26
Payer: COMMERCIAL

## 2023-05-26 VITALS
HEART RATE: 90 BPM | BODY MASS INDEX: 20.49 KG/M2 | SYSTOLIC BLOOD PRESSURE: 121 MMHG | TEMPERATURE: 96.8 F | OXYGEN SATURATION: 99 % | DIASTOLIC BLOOD PRESSURE: 74 MMHG | RESPIRATION RATE: 20 BRPM | WEIGHT: 120 LBS | HEIGHT: 64 IN

## 2023-05-26 VITALS
WEIGHT: 137.3 LBS | HEIGHT: 64 IN | SYSTOLIC BLOOD PRESSURE: 130 MMHG | RESPIRATION RATE: 16 BRPM | HEART RATE: 72 BPM | TEMPERATURE: 97.8 F | BODY MASS INDEX: 23.44 KG/M2 | DIASTOLIC BLOOD PRESSURE: 78 MMHG | OXYGEN SATURATION: 95 %

## 2023-05-26 DIAGNOSIS — K04.7 DENTAL INFECTION: ICD-10-CM

## 2023-05-26 DIAGNOSIS — K08.89 PAIN, DENTAL: ICD-10-CM

## 2023-05-26 DIAGNOSIS — M25.551 BILATERAL HIP PAIN: ICD-10-CM

## 2023-05-26 DIAGNOSIS — M25.552 BILATERAL HIP PAIN: ICD-10-CM

## 2023-05-26 PROCEDURE — 99281 EMR DPT VST MAYX REQ PHY/QHP: CPT

## 2023-05-26 PROCEDURE — 99214 OFFICE O/P EST MOD 30 MIN: CPT | Performed by: FAMILY MEDICINE

## 2023-05-26 PROCEDURE — 3078F DIAST BP <80 MM HG: CPT | Performed by: FAMILY MEDICINE

## 2023-05-26 PROCEDURE — 3075F SYST BP GE 130 - 139MM HG: CPT | Performed by: FAMILY MEDICINE

## 2023-05-26 RX ORDER — IBUPROFEN 600 MG/1
600 TABLET ORAL ONCE
Status: DISCONTINUED | OUTPATIENT
Start: 2023-05-26 | End: 2023-05-26 | Stop reason: HOSPADM

## 2023-05-26 RX ORDER — NAPROXEN 500 MG/1
500 TABLET ORAL 2 TIMES DAILY WITH MEALS
Qty: 28 TABLET | Refills: 0 | Status: SHIPPED | OUTPATIENT
Start: 2023-05-26 | End: 2023-06-09

## 2023-05-26 RX ORDER — AMOXICILLIN 500 MG/1
500 CAPSULE ORAL 3 TIMES DAILY
Qty: 21 CAPSULE | Refills: 0 | Status: SHIPPED | OUTPATIENT
Start: 2023-05-26 | End: 2023-06-02

## 2023-05-26 RX ORDER — ACETAMINOPHEN 325 MG/1
975 TABLET ORAL ONCE
Status: DISCONTINUED | OUTPATIENT
Start: 2023-05-26 | End: 2023-05-26 | Stop reason: HOSPADM

## 2023-05-26 ASSESSMENT — ENCOUNTER SYMPTOMS
WEIGHT LOSS: 0
EYE REDNESS: 0
MYALGIAS: 0
VOMITING: 0
NAUSEA: 0
EYE DISCHARGE: 0
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
## 2023-05-26 NOTE — ED NOTES
Pt washing clothing items in the sink. Pt was asked again to change into gown and be seated on the gurney for assessment
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
## 2023-05-26 NOTE — ED NOTES
Patient eloped. All belongings gone from room when this RN went to update pt on POC. ERP made aware. All bathrooms checked.

## 2023-07-03 ENCOUNTER — APPOINTMENT (OUTPATIENT)
Dept: RADIOLOGY | Facility: MEDICAL CENTER | Age: 48
End: 2023-07-03
Attending: EMERGENCY MEDICINE
Payer: COMMERCIAL

## 2023-07-03 ENCOUNTER — HOSPITAL ENCOUNTER (EMERGENCY)
Facility: MEDICAL CENTER | Age: 48
End: 2023-07-03
Attending: EMERGENCY MEDICINE
Payer: COMMERCIAL

## 2023-07-03 VITALS
DIASTOLIC BLOOD PRESSURE: 80 MMHG | BODY MASS INDEX: 21.17 KG/M2 | OXYGEN SATURATION: 98 % | TEMPERATURE: 97.9 F | SYSTOLIC BLOOD PRESSURE: 138 MMHG | HEIGHT: 64 IN | RESPIRATION RATE: 18 BRPM | WEIGHT: 124 LBS | HEART RATE: 78 BPM

## 2023-07-03 DIAGNOSIS — S20.212A CHEST WALL CONTUSION, LEFT, INITIAL ENCOUNTER: ICD-10-CM

## 2023-07-03 PROCEDURE — 99284 EMERGENCY DEPT VISIT MOD MDM: CPT

## 2023-07-03 PROCEDURE — 71101 X-RAY EXAM UNILAT RIBS/CHEST: CPT | Mod: LT

## 2023-07-03 PROCEDURE — 700101 HCHG RX REV CODE 250: Mod: UD | Performed by: EMERGENCY MEDICINE

## 2023-07-03 RX ORDER — LIDOCAINE 50 MG/G
1 PATCH TOPICAL EVERY 24 HOURS
Status: DISCONTINUED | OUTPATIENT
Start: 2023-07-03 | End: 2023-07-03 | Stop reason: HOSPADM

## 2023-07-03 RX ADMIN — LIDOCAINE 1 PATCH: 50 PATCH CUTANEOUS at 10:15
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
## 2023-07-03 NOTE — ED NOTES
[facility] arranged and set up by NORI, pt. Taking taking through medicaid, pt states understanding

## 2023-07-03 NOTE — DISCHARGE PLANNING
Bedside RN requesting cab voucher.  [place].  Call placed. Shazia ordered. RN Notified. Pt sent to patricia.

## 2023-09-23 ENCOUNTER — OFFICE VISIT (OUTPATIENT)
Dept: URGENT CARE | Facility: CLINIC | Age: 48
End: 2023-09-23
Payer: COMMERCIAL

## 2023-09-23 VITALS
DIASTOLIC BLOOD PRESSURE: 82 MMHG | HEIGHT: 65 IN | OXYGEN SATURATION: 98 % | RESPIRATION RATE: 14 BRPM | SYSTOLIC BLOOD PRESSURE: 120 MMHG | WEIGHT: 119 LBS | TEMPERATURE: 97.6 F | HEART RATE: 76 BPM | BODY MASS INDEX: 19.83 KG/M2

## 2023-09-23 DIAGNOSIS — B07.0 PLANTAR WART: ICD-10-CM

## 2023-09-23 DIAGNOSIS — K04.7 DENTAL INFECTION: ICD-10-CM

## 2023-09-23 DIAGNOSIS — K08.89 PAIN, DENTAL: ICD-10-CM

## 2023-09-23 PROCEDURE — 3079F DIAST BP 80-89 MM HG: CPT | Performed by: PHYSICIAN ASSISTANT

## 2023-09-23 PROCEDURE — 3074F SYST BP LT 130 MM HG: CPT | Performed by: PHYSICIAN ASSISTANT

## 2023-09-23 PROCEDURE — 99213 OFFICE O/P EST LOW 20 MIN: CPT | Mod: 25 | Performed by: PHYSICIAN ASSISTANT

## 2023-09-23 RX ORDER — IBUPROFEN 600 MG/1
600 TABLET ORAL EVERY 8 HOURS PRN
Qty: 21 TABLET | Refills: 0 | Status: SHIPPED | OUTPATIENT
Start: 2023-09-23 | End: 2023-10-10

## 2023-09-23 RX ORDER — AMOXICILLIN 500 MG/1
500 CAPSULE ORAL 3 TIMES DAILY
Qty: 21 CAPSULE | Refills: 0 | Status: SHIPPED | OUTPATIENT
Start: 2023-09-23 | End: 2023-09-30

## 2023-09-23 ASSESSMENT — ENCOUNTER SYMPTOMS
SINUS PRESSURE: 0
CHILLS: 0
FEVER: 0

## 2023-09-23 NOTE — PROGRESS NOTES
"  Subjective:   Ivette Jett is a 48 y.o. female who presents today with   Chief Complaint   Patient presents with    Tooth Ache     Pt has tooth pain x 2 weeks     Bump     Pt has a bump on (L) foot x 3 months      Dental Pain   This is a new problem. The current episode started 1 to 4 weeks ago. The problem occurs constantly. The problem has been gradually worsening. The pain is moderate. Associated symptoms include facial pain. Pertinent negatives include no difficulty swallowing, fever, oral bleeding, sinus pressure or thermal sensitivity. She has tried nothing for the symptoms. The treatment provided no relief.     Patient has been dealing with dental infections in the past.  Has had issues with it for the last couple of weeks.    PMH:  has no past medical history on file.  MEDS:   Current Outpatient Medications:     amoxicillin (AMOXIL) 500 MG Cap, Take 1 Capsule by mouth 3 times a day for 7 days., Disp: 21 Capsule, Rfl: 0    ibuprofen (MOTRIN) 600 MG Tab, Take 1 Tablet by mouth every 8 hours as needed for Moderate Pain., Disp: 21 Tablet, Rfl: 0  ALLERGIES: No Known Allergies  SURGHX: No past surgical history on file.  SOCHX:  reports that she has been smoking cigarettes. She has never used smokeless tobacco. She reports current alcohol use. She reports that she does not currently use drugs after having used the following drugs: Marijuana.  FH: Reviewed with patient, not pertinent to this visit.     Review of Systems   Constitutional:  Negative for chills and fever.   HENT:  Negative for sinus pressure.         Dental infection      Objective:   /82 (BP Location: Left arm, Patient Position: Sitting, BP Cuff Size: Adult)   Pulse 76   Temp 36.4 °C (97.6 °F) (Temporal)   Resp 14   Ht 1.638 m (5' 4.5\")   Wt 54 kg (119 lb)   SpO2 98%   BMI 20.11 kg/m²   Physical Exam  Vitals and nursing note reviewed.   Constitutional:       General: She is not in acute distress.     Appearance: Normal " appearance. She is well-developed. She is not ill-appearing or toxic-appearing.   HENT:      Head: Normocephalic and atraumatic.      Right Ear: Hearing normal.      Left Ear: Hearing normal.      Mouth/Throat:      Dentition: Abnormal dentition. Dental tenderness, gingival swelling, dental caries and dental abscesses present.        Comments: Erythema and swelling to the upper gumline as marked above.  Multiple broken teeth and poor dentition.  No facial swelling.  Cardiovascular:      Rate and Rhythm: Normal rate.   Pulmonary:      Effort: Pulmonary effort is normal.   Musculoskeletal:        Feet:       Comments: Normal movement in all 4 extremities   Feet:      Comments: Plantar wart noted to the bottom of the right foot.  No induration or fluctuance.  No drainage or discharge.  No open lesion or wound site.  Skin:     General: Skin is warm and dry.   Neurological:      Mental Status: She is alert.      Coordination: Coordination normal.   Psychiatric:         Mood and Affect: Mood normal.       Assessment/Plan:   Assessment    1. Dental infection  - amoxicillin (AMOXIL) 500 MG Cap; Take 1 Capsule by mouth 3 times a day for 7 days.  Dispense: 21 Capsule; Refill: 0    2. Pain, dental  - ibuprofen (MOTRIN) 600 MG Tab; Take 1 Tablet by mouth every 8 hours as needed for Moderate Pain.  Dispense: 21 Tablet; Refill: 0    3. Plantar wart  - Referral to Podiatry    Symptoms and presentation are consistent with dental infection and recommend treating with antibiotics.  Patient will continue trying to get in to see a dentist.  Patient would like to follow-up with foot specialist regarding her plantar wart.    Differential diagnosis, natural history, supportive care, and indications for immediate follow-up discussed.   Patient given instructions and understanding of medications and treatment.    If not improving in 3-5 days, F/U with PCP or return to  if symptoms worsen.    Patient agreeable to plan.      Please note  that this dictation was created using voice recognition software. I have made every reasonable attempt to correct obvious errors, but I expect that there are errors of grammar and possibly content that I did not discover before finalizing the note.    Ag Sotelo PA-C

## 2023-10-10 ENCOUNTER — OFFICE VISIT (OUTPATIENT)
Dept: URGENT CARE | Facility: CLINIC | Age: 48
End: 2023-10-10
Payer: COMMERCIAL

## 2023-10-10 ENCOUNTER — HOSPITAL ENCOUNTER (EMERGENCY)
Facility: MEDICAL CENTER | Age: 48
End: 2023-10-10
Payer: COMMERCIAL

## 2023-10-10 VITALS
TEMPERATURE: 99.9 F | BODY MASS INDEX: 20.4 KG/M2 | SYSTOLIC BLOOD PRESSURE: 108 MMHG | OXYGEN SATURATION: 98 % | RESPIRATION RATE: 18 BRPM | WEIGHT: 119.49 LBS | HEART RATE: 98 BPM | DIASTOLIC BLOOD PRESSURE: 74 MMHG | HEIGHT: 64 IN

## 2023-10-10 VITALS
TEMPERATURE: 100.1 F | HEART RATE: 100 BPM | HEIGHT: 64 IN | WEIGHT: 125 LBS | SYSTOLIC BLOOD PRESSURE: 132 MMHG | DIASTOLIC BLOOD PRESSURE: 88 MMHG | BODY MASS INDEX: 21.34 KG/M2 | RESPIRATION RATE: 14 BRPM | OXYGEN SATURATION: 98 %

## 2023-10-10 DIAGNOSIS — K04.7 DENTAL INFECTION: ICD-10-CM

## 2023-10-10 DIAGNOSIS — K08.89 DENTALGIA: ICD-10-CM

## 2023-10-10 PROCEDURE — 3075F SYST BP GE 130 - 139MM HG: CPT | Performed by: NURSE PRACTITIONER

## 2023-10-10 PROCEDURE — 302449 STATCHG TRIAGE ONLY (STATISTIC)

## 2023-10-10 PROCEDURE — 3079F DIAST BP 80-89 MM HG: CPT | Performed by: NURSE PRACTITIONER

## 2023-10-10 PROCEDURE — 99214 OFFICE O/P EST MOD 30 MIN: CPT | Performed by: NURSE PRACTITIONER

## 2023-10-10 RX ORDER — IBUPROFEN 200 MG
600 TABLET ORAL ONCE
Status: COMPLETED | OUTPATIENT
Start: 2023-10-10 | End: 2023-10-10

## 2023-10-10 RX ORDER — CLINDAMYCIN HYDROCHLORIDE 300 MG/1
300 CAPSULE ORAL 3 TIMES DAILY
Qty: 30 CAPSULE | Refills: 0 | Status: SHIPPED | OUTPATIENT
Start: 2023-10-10 | End: 2023-10-17

## 2023-10-10 RX ADMIN — Medication 600 MG: at 12:03

## 2023-10-10 NOTE — PROGRESS NOTES
"Chief Complaint   Patient presents with    Dental Pain     \"All over\"       HISTORY OF PRESENT ILLNESS: Patient is a pleasant 48 y.o. female who presents to urgent care today with complaints of dental pain.  The patient notes that she has had left upper molar dental pain for the past 1.5 weeks.  Endorses malaise, fatigue, tactile fever.  She attempted to contact a dentist but has been unable to get an appointment.    There are no problems to display for this patient.      Allergies:Patient has no known allergies.    Current Outpatient Medications Ordered in Epic   Medication Sig Dispense Refill    clindamycin (CLEOCIN) 300 MG Cap Take 1 Capsule by mouth 3 times a day for 10 days. 30 Capsule 0     Current Facility-Administered Medications Ordered in Epic   Medication Dose Route Frequency Provider Last Rate Last Admin    ibuprofen (Motrin) tablet 600 mg  600 mg Oral Once BLAZE [email or web address].           History reviewed. No pertinent past medical history.    Social History     Tobacco Use    Smoking status: Every Day     Current packs/day: 0.25     Types: Cigarettes    Smokeless tobacco: Never   Vaping Use    Vaping Use: Never used   Substance Use Topics    Alcohol use: Yes     Comment: occasionally    Drug use: Not Currently     Types: Marijuana       No family status information on file.   History reviewed. No pertinent family history.    ROS:  Review of Systems   Constitutional: Positive for tactile fever, malaise, fatigue.    HENT: Positive for dental pain.  Negative for ear pain, nosebleeds, congestion, sore throat and neck pain.    Eyes: Negative for vision changes.   Neuro: Negative for headache, sensory changes, weakness, seizure, LOC.   Cardiovascular: Negative for chest pain, palpitations, orthopnea and leg swelling.   Respiratory: Negative for cough, sputum production, shortness of breath and wheezing.   Gastrointestinal: Negative for abdominal pain, nausea, vomiting or diarrhea.   Genitourinary: Negative for " "dysuria, urgency and frequency.  Musculoskeletal: Negative for falls, neck pain, back pain, joint pain, myalgias.   Skin: Negative for rash, diaphoresis.     Exam:  /88   Pulse 100   Temp 37.8 °C (100.1 °F) (Temporal)   Resp 14   Ht 1.626 m (5' 4\")   Wt 56.7 kg (125 lb)   SpO2 98%   General: well-nourished, well-developed female  Head: normocephalic, atraumatic  Eyes: PERRLA, no conjunctival injection, acuity grossly intact, lids normal.  Ears: normal shape and symmetry, no tenderness, no discharge. External canals are without any significant edema or erythema. Tympanic membranes are without any inflammation, no effusion. Gross auditory acuity is intact.  Nose: symmetrical without tenderness, no discharge.  Mouth/Throat: no erythema, exudates or tonsillar enlargement.  Scattered caries and broken teeth throughout.  Left upper tooth is tender with palpation, no surrounding gumline erythema or signs of abscess.  Neck: no masses, range of motion within normal limits, no tracheal deviation. No obvious thyroid enlargement.   Lymph: no cervical adenopathy. No supraclavicular adenopathy.   Neuro: alert and oriented. Cranial nerves 1-12 grossly intact. No sensory deficit.   Cardiovascular: regular rate auscultated 90, regular rhythm. No edema.  Pulmonary: no distress. Chest is symmetrical with respiration, no wheezes, crackles, or rhonchi.   Musculoskeletal: no clubbing, appropriate muscle tone, gait is stable.  Skin: warm, dry, intact, no clubbing, no cyanosis, no rashes.   Psych: appropriate mood, affect, judgement.         Assessment/Plan:  1. Dental infection  clindamycin (CLEOCIN) 300 MG Cap      2. Dentalgia  ibuprofen (Motrin) tablet 600 mg    clindamycin (CLEOCIN) 300 MG Cap          Patient presents with dental infection.  No signs of abscess.  Patient be treated with outpatient antibiotics with strict ER precautions, she is in agreement.  Motrin provided in clinic.  Clindamycin as directed.  Follow-up " with a dentist soon as possible.  Supportive care, differential diagnoses, and indications for immediate follow-up discussed with patient.   Pathogenesis of diagnosis discussed including typical length and natural progression.   Instructed to return to clinic or nearest emergency department for any change in condition, further concerns, or worsening of symptoms.  Patient states understanding of the plan of care and discharge instructions.      Please note that this dictation was created using voice recognition software. I have made every reasonable attempt to correct obvious errors, but I expect that there are errors of grammar and possibly content that I did not discover before finalizing the note.      MARITZA Pitts.

## 2023-10-11 NOTE — ED TRIAGE NOTES
".  Chief Complaint   Patient presents with    Headache       48 yr patient walked in to triage for above complaint. Per patient she has tried ibuprofen but its not helping.      Pt placed in lobby. Pt educated on triage process. Pt encouraged to alert staff for any changes.     Patient and staff wearing appropriate PPE    /74   Pulse 98   Temp 37.7 °C (99.9 °F) (Temporal)   Resp 18   Ht 1.626 m (5' 4\")   Wt 54.2 kg (119 lb 7.8 oz)   LMP  (LMP Unknown)   SpO2 98%   BMI 20.51 kg/m²    "

## 2023-10-11 NOTE — ED NOTES
"Pt ambulated to check in desk requesting \"Tylenol or Advil,\" stating \"I can't be in pain for another hour and a half.\" Notified pt that we cannot give medications in triage and apologized for wait time. Pt then ambulated with steady gait back to [facility].   "

## 2023-10-16 ENCOUNTER — OFFICE VISIT (OUTPATIENT)
Dept: URGENT CARE | Facility: CLINIC | Age: 48
End: 2023-10-16
Payer: COMMERCIAL

## 2023-10-16 VITALS
HEIGHT: 65 IN | HEART RATE: 89 BPM | DIASTOLIC BLOOD PRESSURE: 70 MMHG | TEMPERATURE: 96.9 F | RESPIRATION RATE: 16 BRPM | WEIGHT: 119 LBS | BODY MASS INDEX: 19.83 KG/M2 | OXYGEN SATURATION: 100 % | SYSTOLIC BLOOD PRESSURE: 118 MMHG

## 2023-10-16 DIAGNOSIS — M54.50 ACUTE LEFT-SIDED LOW BACK PAIN WITHOUT SCIATICA: ICD-10-CM

## 2023-10-16 DIAGNOSIS — K04.7 DENTAL INFECTION: ICD-10-CM

## 2023-10-16 PROCEDURE — 99214 OFFICE O/P EST MOD 30 MIN: CPT | Performed by: NURSE PRACTITIONER

## 2023-10-16 PROCEDURE — 3078F DIAST BP <80 MM HG: CPT | Performed by: NURSE PRACTITIONER

## 2023-10-16 PROCEDURE — 3074F SYST BP LT 130 MM HG: CPT | Performed by: NURSE PRACTITIONER

## 2023-10-16 RX ORDER — AMOXICILLIN 500 MG/1
500 CAPSULE ORAL 2 TIMES DAILY
Qty: 20 CAPSULE | Refills: 0 | Status: SHIPPED | OUTPATIENT
Start: 2023-10-16 | End: 2023-10-26

## 2023-10-16 RX ORDER — IBUPROFEN 800 MG/1
800 TABLET ORAL EVERY 8 HOURS PRN
Qty: 30 TABLET | Refills: 0 | Status: SHIPPED | OUTPATIENT
Start: 2023-10-16 | End: 2023-11-06

## 2023-10-17 ASSESSMENT — ENCOUNTER SYMPTOMS
NAUSEA: 0
CHILLS: 0
EYE PAIN: 0
BACK PAIN: 1
SHORTNESS OF BREATH: 0
DIZZINESS: 0
VOMITING: 0
MYALGIAS: 0
FEVER: 0
SORE THROAT: 0
FLANK PAIN: 0

## 2023-10-17 NOTE — PROGRESS NOTES
Subjective:   Ivette Jett is a 48 y.o. female who presents for Dental Pain and Back Pain (X 2 wks )      HPI  Patient is a 40-year-old female who returns to the urgent care for reevaluation of ongoing dental pain that she has been seen for on a couple different occasions.  She does have chronic dental infections not able to get into a dentist.  She was recently seen in the emergency room for which they put her on clindamycin.  Patient not tolerating the medication and has discontinued.  Patient also has been having persistent low back pain.  No acute injury.  She denies any burning with urination or frequency.  She is concerned that she might have a urinary tract infection.  Denies any fever or chills.    Review of Systems   Constitutional:  Negative for chills and fever.   HENT:  Negative for sore throat.         Dental pain   Eyes:  Negative for pain.   Respiratory:  Negative for shortness of breath.    Cardiovascular:  Negative for chest pain.   Gastrointestinal:  Negative for nausea and vomiting.   Genitourinary:  Negative for dysuria, flank pain, frequency, hematuria and urgency.   Musculoskeletal:  Positive for back pain. Negative for myalgias.   Skin:  Negative for rash.   Neurological:  Negative for dizziness.       Medications:    amoxicillin Caps  ibuprofen Tabs    Allergies: Patient has no known allergies.    Problem List: Ivette Jett does not have a problem list on file.    Surgical History:  No past surgical history on file.    Past Social Hx: Ivette Jett  reports that she has been smoking cigarettes. She has never used smokeless tobacco. She reports current alcohol use. She reports that she does not currently use drugs after having used the following drugs: Marijuana.     Past Family Hx:  Ivette Jett family history is not on file.     Problem list, medications, and allergies reviewed by myself today in Epic.     Objective:     /70   Pulse 89    "Temp 36.1 °C (96.9 °F)   Resp 16   Ht 1.638 m (5' 4.5\")   Wt 54 kg (119 lb)   LMP  (LMP Unknown)   SpO2 100%   BMI 20.11 kg/m²     Physical Exam  Vitals and nursing note reviewed.   Constitutional:       General: She is not in acute distress.     Appearance: She is well-developed.   HENT:      Head: Normocephalic and atraumatic.      Right Ear: Tympanic membrane and external ear normal.      Left Ear: Tympanic membrane and external ear normal.      Nose: Nose normal.      Right Sinus: No maxillary sinus tenderness or frontal sinus tenderness.      Left Sinus: No maxillary sinus tenderness or frontal sinus tenderness.      Mouth/Throat:      Mouth: Mucous membranes are moist.      Dentition: Abnormal dentition. Dental tenderness and dental caries present. No dental abscesses.      Pharynx: Uvula midline. No posterior oropharyngeal erythema.      Tonsils: No tonsillar exudate or tonsillar abscesses.   Eyes:      General:         Right eye: No discharge.         Left eye: No discharge.      Conjunctiva/sclera: Conjunctivae normal.   Cardiovascular:      Rate and Rhythm: Normal rate.   Pulmonary:      Effort: Pulmonary effort is normal. No respiratory distress.      Breath sounds: Normal breath sounds.   Abdominal:      General: There is no distension.   Musculoskeletal:         General: Normal range of motion.      Cervical back: Normal.      Thoracic back: Normal.      Lumbar back: Spasms and tenderness present. No swelling, deformity or bony tenderness. Normal range of motion. Negative right straight leg raise test and negative left straight leg raise test.        Back:    Skin:     General: Skin is warm and dry.   Neurological:      General: No focal deficit present.      Mental Status: She is alert and oriented to person, place, and time. Mental status is at baseline.      Gait: Gait (gait at baseline) normal.   Psychiatric:         Judgment: Judgment normal.         Assessment/Plan:     Diagnosis and " associated orders:     1. Dental infection  ibuprofen (MOTRIN) 800 MG Tab    amoxicillin (AMOXIL) 500 MG Cap      2. Acute left-sided low back pain without sciatica  ibuprofen (MOTRIN) 800 MG Tab    amoxicillin (AMOXIL) 500 MG Cap    POCT Urinalysis           Comments/MDM:     I personally reviewed prior external notes and prior test results pertinent to today's visit.  UA negative.  Reviewed patient's medical record recently seen in the emergency room 10/10 was on clindamycin patient not tolerating the medication has discontinued requesting to be treated with amoxicillin.  Patient will be started on amoxicillin today.  Advised to follow-up with dentist soon as possible.  Did discuss symptoms likely musculoskeletal no CVA tenderness, no infectious etiology suspected in the urine.  Encouraged range of motion, heat, massage, stretching.  Patient declining muscle relaxants as she is sleeping on the streets and does not want to be able to wake up.  Discussed management options, risks and benefits, and alternatives to treatment plan agreed upon.   Red flags discussed and indications to immediately call 911 or present to the Emergency Department.   Supportive care, differential diagnoses, and indications for immediate follow-up discussed with patient.    Patient expresses understanding and agrees to plan. Patient denies any other questions or concerns.            My total time spent caring for the patient on the day of the encounter was 31 minutes.   This does not include time spent on separately billable procedures/tests.    Please note that this dictation was created using voice recognition software. I have made a reasonable attempt to correct obvious errors, but I expect that there are errors of grammar and possibly content that I did not discover before finalizing the note.    This note was electronically signed by Mayco TRUJILLO.

## 2023-11-06 ENCOUNTER — HOSPITAL ENCOUNTER (OUTPATIENT)
Facility: MEDICAL CENTER | Age: 48
End: 2023-11-06
Attending: PHYSICIAN ASSISTANT
Payer: COMMERCIAL

## 2023-11-06 ENCOUNTER — OFFICE VISIT (OUTPATIENT)
Dept: URGENT CARE | Facility: CLINIC | Age: 48
End: 2023-11-06
Payer: COMMERCIAL

## 2023-11-06 VITALS
SYSTOLIC BLOOD PRESSURE: 100 MMHG | HEART RATE: 81 BPM | OXYGEN SATURATION: 99 % | RESPIRATION RATE: 13 BRPM | WEIGHT: 122.2 LBS | HEIGHT: 65 IN | DIASTOLIC BLOOD PRESSURE: 62 MMHG | BODY MASS INDEX: 20.36 KG/M2 | TEMPERATURE: 99 F

## 2023-11-06 DIAGNOSIS — B96.89 BACTERIAL VAGINOSIS: ICD-10-CM

## 2023-11-06 DIAGNOSIS — S02.5XXS OPEN FRACTURE OF TOOTH, SEQUELA: ICD-10-CM

## 2023-11-06 DIAGNOSIS — Z59.01 SHELTERED HOMELESSNESS: ICD-10-CM

## 2023-11-06 DIAGNOSIS — N76.0 BACTERIAL VAGINOSIS: ICD-10-CM

## 2023-11-06 DIAGNOSIS — N76.0 ACUTE VAGINITIS: ICD-10-CM

## 2023-11-06 DIAGNOSIS — R10.31 RIGHT INGUINAL PAIN: ICD-10-CM

## 2023-11-06 LAB
APPEARANCE UR: CLEAR
BILIRUB UR STRIP-MCNC: NORMAL MG/DL
COLOR UR AUTO: NORMAL
GLUCOSE UR STRIP.AUTO-MCNC: NEGATIVE MG/DL
KETONES UR STRIP.AUTO-MCNC: NORMAL MG/DL
LEUKOCYTE ESTERASE UR QL STRIP.AUTO: NEGATIVE
NITRITE UR QL STRIP.AUTO: NEGATIVE
PH UR STRIP.AUTO: 5.5 [PH] (ref 5–8)
POCT INT CON NEG: NEGATIVE
POCT INT CON POS: POSITIVE
POCT URINE PREGNANCY TEST: NEGATIVE
PROT UR QL STRIP: NORMAL MG/DL
RBC UR QL AUTO: NEGATIVE
SP GR UR STRIP.AUTO: 1.03
UROBILINOGEN UR STRIP-MCNC: 0.2 MG/DL

## 2023-11-06 PROCEDURE — 87591 N.GONORRHOEAE DNA AMP PROB: CPT

## 2023-11-06 PROCEDURE — 99214 OFFICE O/P EST MOD 30 MIN: CPT | Performed by: PHYSICIAN ASSISTANT

## 2023-11-06 PROCEDURE — 3074F SYST BP LT 130 MM HG: CPT | Performed by: PHYSICIAN ASSISTANT

## 2023-11-06 PROCEDURE — 81025 URINE PREGNANCY TEST: CPT | Performed by: PHYSICIAN ASSISTANT

## 2023-11-06 PROCEDURE — 87510 GARDNER VAG DNA DIR PROBE: CPT

## 2023-11-06 PROCEDURE — 87491 CHLMYD TRACH DNA AMP PROBE: CPT

## 2023-11-06 PROCEDURE — 87480 CANDIDA DNA DIR PROBE: CPT

## 2023-11-06 PROCEDURE — 81002 URINALYSIS NONAUTO W/O SCOPE: CPT | Performed by: PHYSICIAN ASSISTANT

## 2023-11-06 PROCEDURE — 87660 TRICHOMONAS VAGIN DIR PROBE: CPT

## 2023-11-06 PROCEDURE — 3078F DIAST BP <80 MM HG: CPT | Performed by: PHYSICIAN ASSISTANT

## 2023-11-06 RX ORDER — IBUPROFEN 600 MG/1
600 TABLET ORAL EVERY 6 HOURS PRN
Qty: 30 TABLET | Refills: 0 | Status: SHIPPED | OUTPATIENT
Start: 2023-11-06 | End: 2023-12-14

## 2023-11-06 RX ORDER — AMOXICILLIN AND CLAVULANATE POTASSIUM 875; 125 MG/1; MG/1
1 TABLET, FILM COATED ORAL 2 TIMES DAILY
Qty: 14 TABLET | Refills: 0 | Status: SHIPPED | OUTPATIENT
Start: 2023-11-06 | End: 2023-11-13

## 2023-11-06 SDOH — ECONOMIC STABILITY - HOUSING INSECURITY: SHELTERED HOMELESSNESS: Z59.01

## 2023-11-06 NOTE — PROGRESS NOTES
Subjective:   Ivette Jett is a 48 y.o. female who presents for Dental Pain (Patient states tooth broke last night /), Hernia, Painful Urination, and Vaginal Itching        1.  Patient presents with concerns of vaginal itching and irritation for the last week.  She has had some white discharge.  She suspects that symptoms might be due to a yeast infection.  She has some mild burning with urination but no urinary urgency, urinary frequency or hematuria.  She denies flank pain.  No nausea, vomiting, fevers, chills.  No known exposure to STI, but patient would like to be tested.    2.  Patient states that she also has a fractured tooth right lower side that has become painful.  She suspects that she might have an infection.  She has had similar tooth infections in the past.  She is not currently established with dental.    3.  Patient also is concerned about right inguinal pain and bulging that has been present for the last year.  Symptoms are not worsening but they are also not improving.      Review of Systems   Constitutional:  Negative for chills and fever.   Gastrointestinal:  Negative for blood in stool, constipation, diarrhea, melena, nausea and vomiting.   Genitourinary:  Negative for dysuria, flank pain, frequency, hematuria and urgency.       PMH:  has no past medical history on file.  MEDS:   Current Outpatient Medications:     amoxicillin-clavulanate (AUGMENTIN) 875-125 MG Tab, Take 1 Tablet by mouth 2 times a day for 7 days., Disp: 14 Tablet, Rfl: 0    ibuprofen (MOTRIN) 600 MG Tab, Take 1 Tablet by mouth every 6 hours as needed for Moderate Pain., Disp: 30 Tablet, Rfl: 0  ALLERGIES: No Known Allergies  SURGHX: History reviewed. No pertinent surgical history.  SOCHX:  reports that she has been smoking cigarettes. She has never used smokeless tobacco. She reports current alcohol use. She reports that she does not currently use drugs after having used the following drugs: Marijuana.  FH: Family  "history was reviewed, no pertinent findings to report   Objective:   /62 (BP Location: Left arm, Patient Position: Sitting, BP Cuff Size: Adult long)   Pulse 81   Temp 37.2 °C (99 °F) (Temporal)   Resp 13   Ht 1.638 m (5' 4.5\")   Wt 55.4 kg (122 lb 3.2 oz)   LMP  (LMP Unknown)   SpO2 99%   BMI 20.65 kg/m²   Physical Exam  Vitals reviewed.   Constitutional:       General: She is not in acute distress.     Appearance: Normal appearance. She is well-developed. She is not toxic-appearing.   HENT:      Head: Normocephalic and atraumatic.      Right Ear: External ear normal.      Left Ear: External ear normal.      Nose: Nose normal.      Mouth/Throat:      Comments: Missing dentition and damaged dentition throughout. Posterior oropharynx is patent. No difficulty opening mouth. No trismus. No visible abscess or lesions.   Cardiovascular:      Rate and Rhythm: Normal rate and regular rhythm.   Pulmonary:      Effort: Pulmonary effort is normal. No respiratory distress.      Breath sounds: No stridor.   Abdominal:      Comments: Right inguinal bulge. No overlying erythema or edema. Mildly TTP.   Genitourinary:     Comments: declined  Musculoskeletal:      Cervical back: Normal range of motion and neck supple.   Lymphadenopathy:      Cervical:      Right cervical: No superficial cervical adenopathy.     Left cervical: No superficial cervical adenopathy.   Skin:     General: Skin is dry.   Neurological:      Comments: Alert and oriented.    Psychiatric:         Speech: Speech normal.         Behavior: Behavior normal.           Assessment/Plan:   1. Acute vaginitis  - POCT Urinalysis  - POCT Pregnancy  - VAGINAL PATHOGENS DNA PANEL; Future  - Chlamydia/GC, PCR (Genital/Anal swab); Future  - HIV AG/AB Combo Assay Screening; Future  - T.Pallidum AB ROSIE (Screening); Future  - Hepatitis C Virus Antibody; Future  - HEP B Surface Antibody; Future  - Hep B Core AB Total; Future  - Hep B Surface Antigen; Future  - " Referral to establish with [facility] PCP  - Referral to OB/Gyn    2. Right inguinal pain  - US-INGUINAL HERNIA; Future  - Referral to establish with [facility] PCP  - Referral to OB/Gyn    3. Open fracture of tooth, sequela  - amoxicillin-clavulanate (AUGMENTIN) 875-125 MG Tab; Take 1 Tablet by mouth 2 times a day for 7 days.  Dispense: 14 Tablet; Refill: 0  - ibuprofen (MOTRIN) 600 MG Tab; Take 1 Tablet by mouth every 6 hours as needed for Moderate Pain.  Dispense: 30 Tablet; Refill: 0    4. Sheltered homelessness    5. Bacterial vaginosis  - metroNIDAZOLE (METROGEL-VAGINAL) 0.75 % Gel; Insert 1 Applicator into the vagina at bedtime for 5 days.  Dispense: 5 Each; Refill: 0    Recommend full STI testing. Pt positive for BV- metrogel sent to her pharmacy. Return precautions reviewed.  US ordered. Pt will call to schedule. Red flag s/sx and ED precautions reviwed.  Pt started on Augmentin and may take ibuprofen prn pain. Strict ED precautions.

## 2023-11-07 LAB
C TRACH DNA GENITAL QL NAA+PROBE: NEGATIVE
CANDIDA DNA VAG QL PROBE+SIG AMP: NEGATIVE
G VAGINALIS DNA VAG QL PROBE+SIG AMP: POSITIVE
N GONORRHOEA DNA GENITAL QL NAA+PROBE: NEGATIVE
SPECIMEN SOURCE: NORMAL
T VAGINALIS DNA VAG QL PROBE+SIG AMP: NEGATIVE

## 2023-11-07 RX ORDER — METRONIDAZOLE 7.5 MG/G
1 GEL VAGINAL
Qty: 5 EACH | Refills: 0 | Status: SHIPPED | OUTPATIENT
Start: 2023-11-07 | End: 2023-11-12

## 2023-11-13 ENCOUNTER — TELEPHONE (OUTPATIENT)
Dept: SCHEDULING | Facility: IMAGING CENTER | Age: 48
End: 2023-11-13
Payer: COMMERCIAL

## 2023-11-13 ASSESSMENT — ENCOUNTER SYMPTOMS
FEVER: 0
BLOOD IN STOOL: 0
VOMITING: 0
CONSTIPATION: 0
NAUSEA: 0
FLANK PAIN: 0
CHILLS: 0
DIARRHEA: 0

## 2023-12-11 ENCOUNTER — OFFICE VISIT (OUTPATIENT)
Dept: URGENT CARE | Facility: CLINIC | Age: 48
End: 2023-12-11
Payer: COMMERCIAL

## 2023-12-11 VITALS
HEART RATE: 94 BPM | BODY MASS INDEX: 20.83 KG/M2 | SYSTOLIC BLOOD PRESSURE: 124 MMHG | TEMPERATURE: 98.2 F | HEIGHT: 64 IN | RESPIRATION RATE: 16 BRPM | WEIGHT: 122 LBS | DIASTOLIC BLOOD PRESSURE: 80 MMHG | OXYGEN SATURATION: 97 %

## 2023-12-11 DIAGNOSIS — Z11.3 SCREENING EXAMINATION FOR STD (SEXUALLY TRANSMITTED DISEASE): ICD-10-CM

## 2023-12-11 DIAGNOSIS — L84 CORN OF FOOT: ICD-10-CM

## 2023-12-11 DIAGNOSIS — K40.90 NON-RECURRENT UNILATERAL INGUINAL HERNIA WITHOUT OBSTRUCTION OR GANGRENE: ICD-10-CM

## 2023-12-11 PROCEDURE — 3079F DIAST BP 80-89 MM HG: CPT

## 2023-12-11 PROCEDURE — 99214 OFFICE O/P EST MOD 30 MIN: CPT | Mod: 25

## 2023-12-11 PROCEDURE — 3074F SYST BP LT 130 MM HG: CPT

## 2023-12-11 ASSESSMENT — ENCOUNTER SYMPTOMS
SHORTNESS OF BREATH: 0
DIARRHEA: 0
ABDOMINAL PAIN: 1
FEVER: 0
BACK PAIN: 1
BLOOD IN STOOL: 0
CONSTIPATION: 0

## 2023-12-11 NOTE — PROGRESS NOTES
Subjective:     CHIEF COMPLAINT  Chief Complaint   Patient presents with    Abdominal Pain     Poss hernia, x few weeks     Foot Problem     Sore on L plantar, x few months        HPI  Ivette Jett is a very pleasant 48 y.o. female who presents with pain to the sole of her left foot that has been present for several months.  She reports that she has pain when she bears weight and has noticed an area of callus formation on the front of her foot.  She has not had any traumatic injuries to her foot.  She reports that she has been shaving it with a razor and that has improved her symptoms slightly.    Additionally, the patient has noticed a bulging at the inguinal area of her right abdomen that has been present for several weeks.  She has been starting to experience pain in the area especially when it is cold outside.  She has noticed that she has been having less frequent bowel movements, although she does report that she does not eat enough fiber or drink enough water.  She has also been having some low back pain.  She is able to reduce the mass on her abdomen with the application of pressure.    Additionally, the patient is requesting STD screening.  She reports that she had a new sexual partner.  She has not had any changes in her vaginal discharge or symptoms she is concerned about at this time.    REVIEW OF SYSTEMS  Review of Systems   Constitutional:  Negative for fever.   Respiratory:  Negative for shortness of breath.    Cardiovascular:  Negative for chest pain.   Gastrointestinal:  Positive for abdominal pain (Intermittent at area of hernia). Negative for blood in stool, constipation and diarrhea.   Genitourinary:  Negative for dysuria.   Musculoskeletal:  Positive for back pain (Low back pain).       PAST MEDICAL HISTORY  There are no problems to display for this patient.      SURGICAL HISTORY  patient denies any surgical history    ALLERGIES  No Known Allergies    CURRENT MEDICATIONS  Home  "Medications       Reviewed by Taylor James P.A.-C. (Physician Assistant) on 12/11/23 at 1516  Med List Status: <None>     Medication Last Dose Status   ibuprofen (MOTRIN) 600 MG Tab PRN Active                    SOCIAL HISTORY  Social History     Tobacco Use    Smoking status: Every Day     Current packs/day: 0.25     Types: Cigarettes    Smokeless tobacco: Never   Vaping Use    Vaping Use: Never used   Substance and Sexual Activity    Alcohol use: Yes     Comment: occasionally    Drug use: Not Currently     Types: Marijuana    Sexual activity: Not on file       FAMILY HISTORY  History reviewed. No pertinent family history.       Objective:     VITAL SIGNS: /80   Pulse 94   Temp 36.8 °C (98.2 °F)   Resp 16   Ht 1.626 m (5' 4\")   Wt 55.3 kg (122 lb)   SpO2 97%   BMI 20.94 kg/m²     PHYSICAL EXAM  Physical Exam  Vitals reviewed.   Constitutional:       General: She is not in acute distress.     Appearance: Normal appearance. She is not ill-appearing, toxic-appearing or diaphoretic.      Comments: Patient is soaking her foot in the sink upon my entry to the room and is shaving the sole of her foot with a razor.    HENT:      Head: Normocephalic and atraumatic.      Nose: Nose normal.      Mouth/Throat:      Mouth: Mucous membranes are moist.   Eyes:      Conjunctiva/sclera: Conjunctivae normal.   Cardiovascular:      Rate and Rhythm: Normal rate.   Pulmonary:      Effort: Pulmonary effort is normal. No respiratory distress.   Abdominal:      General: Abdomen is flat. There is no distension.      Palpations: Abdomen is soft. There is no mass.      Tenderness: There is no abdominal tenderness. There is no guarding or rebound.      Hernia: A hernia is present. Hernia is present in the right inguinal area.          Comments: Hernia present to the right inguinal area which is reducible with pressure applied.    Musculoskeletal:        Feet:    Feet:      Comments: Corn present to underside of left foot. "  No surrounding erythema.  Nonfluctuant.  Skin:     General: Skin is warm and dry.      Coloration: Skin is not pale.   Neurological:      General: No focal deficit present.      Mental Status: She is alert.   Psychiatric:         Attention and Perception: She is inattentive.         Behavior: Behavior is uncooperative.      Comments: Patient appears distracted during her examination. She has filled her urinalysis cup with water from the sink.          Assessment/Plan:     1. Non-recurrent unilateral inguinal hernia without obstruction or gangrene  - POCT Urinalysis  - POCT Pregnancy  - Referral to General Surgery    2. [place] of foot  - Referral to Podiatry    3. Screening examination for STD (sexually transmitted disease)  - Chlamydia/GC, PCR (Genital/Anal swab); Future  - HIV AG/AB Combo Assay Screening; Future  - T.Pallidum AB ROSIE (Screening); Future  - Trichomonas Vaginalis by TMA  - Hepatitis C Virus Antibody; Future  - HEP B Surface Antibody; Future  - Hep B Core AB Total; Future  - Hep B Surface Antigen; Future  - VAGINAL PATHOGENS DNA PANEL; Future  -Urinalysis unable to be performed due to patient putting water from the sink into her urinalysis.  Patient was asked to come back in to provide a urine sample, but refused and reports that she will return tomorrow  -Be seen in emergency department if pain increases to hernia, if changes in bowel movements occur, or if fever occurs    MDM/Comments:  Patient has stable vital signs and is non-toxic appearing.  Patient appears relatively confused and has filled her urinalysis cup with water.  When patient was asked to return to the clinic by the medical assistant, she refused and reports she will return tomorrow.  Patient was informed that we cannot perform STD screening for gonorrhea and chlamydia without having swabs performed and we cannot perform a urinalysis without an actual urine sample.  Patient still refused to return to the clinic.  Referral placed to  podiatry for corn on the bottom of the patient's left foot.  Additionally, referral placed to general surgery for right inguinal hernia.  Strict ER precautions discussed with patient if her symptoms worsen.  Discussed signs of strangulation of hernia.  Discussed supportive care with hydration, rest, Tylenol/Ibuprofen as needed. Patient demonstrated understanding of treatment plan at this time and will RTC/ER if symptoms worsen or fail to resolve.     Patient had 3 separate chief complaints that were addressed during her visit today such as STD screening, a new onset inguinal hernia, and a corn to her left foot.      Patient also reports she does not have a cell phone and is uncertain if the number we have on file is correct.  She reports that she will return to the clinic tomorrow with an updated phone number. Patient may be homeless, as she has come to the clinic with a large shopping cart and a large supply of belongings. Patient has potential psychiatric issues. Discussed following up with PCP.     Differential diagnosis, natural history, supportive care, and indications for immediate follow-up discussed. All questions answered. Patient agrees with the plan of care.    Follow-up as needed if symptoms worsen or fail to improve to PCP, Urgent care or Emergency Room.    I have personally reviewed prior external notes and test results pertinent to today's visit.  I have independently reviewed and interpreted all diagnostics ordered during this urgent care acute visit.   Discussed management options (risks,benefits, and alternatives to treatment). Pt expresses understanding and the treatment plan was agreed upon. Questions were encouraged and answered to pt's satisfaction.    Please note that this dictation was created using voice recognition software. I have made a reasonable attempt to correct obvious errors, but I expect that there are errors of grammar and possibly content that I did not discover before finalizing  the note.

## 2023-12-14 ENCOUNTER — OFFICE VISIT (OUTPATIENT)
Dept: URGENT CARE | Facility: CLINIC | Age: 48
End: 2023-12-14
Payer: COMMERCIAL

## 2023-12-14 VITALS
SYSTOLIC BLOOD PRESSURE: 140 MMHG | OXYGEN SATURATION: 100 % | DIASTOLIC BLOOD PRESSURE: 90 MMHG | WEIGHT: 124.5 LBS | RESPIRATION RATE: 16 BRPM | TEMPERATURE: 98.3 F | BODY MASS INDEX: 20.74 KG/M2 | HEIGHT: 65 IN | HEART RATE: 83 BPM

## 2023-12-14 DIAGNOSIS — G89.29 CHRONIC BILATERAL BACK PAIN, UNSPECIFIED BACK LOCATION: ICD-10-CM

## 2023-12-14 DIAGNOSIS — M54.9 CHRONIC BILATERAL BACK PAIN, UNSPECIFIED BACK LOCATION: ICD-10-CM

## 2023-12-14 DIAGNOSIS — R30.0 DYSURIA: ICD-10-CM

## 2023-12-14 DIAGNOSIS — N30.00 ACUTE CYSTITIS WITHOUT HEMATURIA: ICD-10-CM

## 2023-12-14 DIAGNOSIS — R25.2 SPASM: ICD-10-CM

## 2023-12-14 LAB
APPEARANCE UR: CLEAR
BILIRUB UR STRIP-MCNC: NEGATIVE MG/DL
COLOR UR AUTO: YELLOW
GLUCOSE UR STRIP.AUTO-MCNC: NEGATIVE MG/DL
KETONES UR STRIP.AUTO-MCNC: NORMAL MG/DL
LEUKOCYTE ESTERASE UR QL STRIP.AUTO: NORMAL
NITRITE UR QL STRIP.AUTO: NEGATIVE
PH UR STRIP.AUTO: 6 [PH] (ref 5–8)
PROT UR QL STRIP: NEGATIVE MG/DL
RBC UR QL AUTO: NEGATIVE
SP GR UR STRIP.AUTO: >=1.03
UROBILINOGEN UR STRIP-MCNC: NORMAL MG/DL

## 2023-12-14 PROCEDURE — 3077F SYST BP >= 140 MM HG: CPT | Performed by: FAMILY MEDICINE

## 2023-12-14 PROCEDURE — 99213 OFFICE O/P EST LOW 20 MIN: CPT | Performed by: FAMILY MEDICINE

## 2023-12-14 PROCEDURE — 3080F DIAST BP >= 90 MM HG: CPT | Performed by: FAMILY MEDICINE

## 2023-12-14 PROCEDURE — 81002 URINALYSIS NONAUTO W/O SCOPE: CPT | Performed by: FAMILY MEDICINE

## 2023-12-14 RX ORDER — IBUPROFEN 600 MG/1
600 TABLET ORAL EVERY 6 HOURS PRN
Qty: 30 TABLET | Refills: 1 | Status: SHIPPED | OUTPATIENT
Start: 2023-12-14 | End: 2024-01-23

## 2023-12-14 RX ORDER — CYCLOBENZAPRINE HCL 10 MG
10 TABLET ORAL 3 TIMES DAILY PRN
Qty: 20 TABLET | Refills: 0 | Status: SHIPPED | OUTPATIENT
Start: 2023-12-14 | End: 2024-01-23

## 2023-12-14 RX ORDER — NITROFURANTOIN 25; 75 MG/1; MG/1
100 CAPSULE ORAL 2 TIMES DAILY
Qty: 10 CAPSULE | Refills: 0 | Status: SHIPPED | OUTPATIENT
Start: 2023-12-14 | End: 2023-12-19

## 2023-12-14 ASSESSMENT — ENCOUNTER SYMPTOMS
MYALGIAS: 0
EYE DISCHARGE: 0
WEIGHT LOSS: 0
NAUSEA: 0
EYE REDNESS: 0
VOMITING: 0

## 2023-12-14 NOTE — PROGRESS NOTES
"Subjective     Ivette Jett is a 48 y.o. female who presents with Back Pain (Lower back pain x 2 months, possible UTI/Bladder infection, possible hernia or a cyst on the right pelvic area x 2 weeks) and Neck Pain (Right neck pain x 1 month)            2 weeks urinary burning, urgency, frequency. No fever. No vaginal discharge/rash/lesion. Intermittently feels feverish. Does not suspect pregnancy.     2 months low back and neck pain. Neck feels stiff. No fever prior to 2 weeks ago. No other aggravating or alleviating factors.          Review of Systems   Constitutional:  Negative for malaise/fatigue and weight loss.   Eyes:  Negative for discharge and redness.   Gastrointestinal:  Negative for nausea and vomiting.   Musculoskeletal:  Negative for joint pain and myalgias.   Skin:  Negative for itching and rash.              Objective     BP (!) 140/90 (BP Location: Right arm, Patient Position: Sitting, BP Cuff Size: Small adult)   Pulse 83   Temp 36.8 °C (98.3 °F) (Temporal)   Resp 16   Ht 1.638 m (5' 4.5\")   Wt 56.5 kg (124 lb 8 oz)   SpO2 100%   BMI 21.04 kg/m²      Physical Exam  Constitutional:       General: She is not in acute distress.     Appearance: She is well-developed.   HENT:      Head: Normocephalic and atraumatic.   Eyes:      Conjunctiva/sclera: Conjunctivae normal.   Cardiovascular:      Rate and Rhythm: Normal rate and regular rhythm.      Heart sounds: Normal heart sounds. No murmur heard.  Pulmonary:      Effort: Pulmonary effort is normal.      Breath sounds: Normal breath sounds. No wheezing.   Abdominal:      Tenderness: There is abdominal tenderness (suprapubic). There is no right CVA tenderness or left CVA tenderness.      Comments: Reducible right inguinal hernia   Musculoskeletal:      Comments: Tender and spasm paralumbar and parathoracic region.  No midline bony tenderness or step-off.  Range of motion is intact.   Skin:     General: Skin is warm and dry.      Findings: " No rash.   Neurological:      General: No focal deficit present.      Mental Status: She is alert.      Deep Tendon Reflexes: Reflexes normal.      Comments: Bilateral lower extremity strength and sensory intact.  Negative straight leg raise.                               Assessment & Plan     Poct ua reviewed    1. Dysuria  POCT Urinalysis      2. Acute cystitis without hematuria  nitrofurantoin (MACROBID) 100 MG Cap      3. Chronic bilateral back pain, unspecified back location  ibuprofen (MOTRIN) 600 MG Tab      4. Spasm  cyclobenzaprine (FLEXERIL) 10 mg Tab        Differential diagnosis, natural history, supportive care, and indications for immediate follow-up were discussed.

## 2023-12-18 ENCOUNTER — OFFICE VISIT (OUTPATIENT)
Dept: URGENT CARE | Facility: CLINIC | Age: 48
End: 2023-12-18
Payer: COMMERCIAL

## 2023-12-18 VITALS
OXYGEN SATURATION: 100 % | HEIGHT: 65 IN | SYSTOLIC BLOOD PRESSURE: 120 MMHG | RESPIRATION RATE: 12 BRPM | HEART RATE: 83 BPM | TEMPERATURE: 98.2 F | DIASTOLIC BLOOD PRESSURE: 68 MMHG | WEIGHT: 129 LBS | BODY MASS INDEX: 21.49 KG/M2

## 2023-12-18 DIAGNOSIS — R11.2 NAUSEA AND VOMITING, UNSPECIFIED VOMITING TYPE: ICD-10-CM

## 2023-12-18 DIAGNOSIS — N30.91 CYSTITIS WITH HEMATURIA: ICD-10-CM

## 2023-12-18 DIAGNOSIS — R30.0 DYSURIA: ICD-10-CM

## 2023-12-18 LAB
APPEARANCE UR: CLEAR
BILIRUB UR STRIP-MCNC: NEGATIVE MG/DL
COLOR UR AUTO: YELLOW
GLUCOSE UR STRIP.AUTO-MCNC: NEGATIVE MG/DL
KETONES UR STRIP.AUTO-MCNC: NORMAL MG/DL
LEUKOCYTE ESTERASE UR QL STRIP.AUTO: NORMAL
NITRITE UR QL STRIP.AUTO: NEGATIVE
PH UR STRIP.AUTO: 5.5 [PH] (ref 5–8)
POCT INT CON NEG: NEGATIVE
POCT INT CON POS: POSITIVE
POCT URINE PREGNANCY TEST: NEGATIVE
PROT UR QL STRIP: 30 MG/DL
RBC UR QL AUTO: NORMAL
SP GR UR STRIP.AUTO: 1.03
UROBILINOGEN UR STRIP-MCNC: 0.2 MG/DL

## 2023-12-18 PROCEDURE — 99213 OFFICE O/P EST LOW 20 MIN: CPT | Performed by: PHYSICIAN ASSISTANT

## 2023-12-18 PROCEDURE — 3078F DIAST BP <80 MM HG: CPT | Performed by: PHYSICIAN ASSISTANT

## 2023-12-18 PROCEDURE — 81025 URINE PREGNANCY TEST: CPT | Performed by: PHYSICIAN ASSISTANT

## 2023-12-18 PROCEDURE — 81002 URINALYSIS NONAUTO W/O SCOPE: CPT | Performed by: PHYSICIAN ASSISTANT

## 2023-12-18 PROCEDURE — 3074F SYST BP LT 130 MM HG: CPT | Performed by: PHYSICIAN ASSISTANT

## 2023-12-18 RX ORDER — ONDANSETRON 4 MG/1
4 TABLET, ORALLY DISINTEGRATING ORAL EVERY 6 HOURS PRN
Qty: 15 TABLET | Refills: 0 | Status: SHIPPED | OUTPATIENT
Start: 2023-12-18 | End: 2024-01-23

## 2023-12-18 RX ORDER — SULFAMETHOXAZOLE AND TRIMETHOPRIM 800; 160 MG/1; MG/1
1 TABLET ORAL 2 TIMES DAILY
Qty: 14 TABLET | Refills: 0 | Status: SHIPPED | OUTPATIENT
Start: 2023-12-18 | End: 2023-12-25 | Stop reason: SDUPTHER

## 2023-12-18 ASSESSMENT — ENCOUNTER SYMPTOMS
DIARRHEA: 1
VOMITING: 1

## 2023-12-18 NOTE — PROGRESS NOTES
"Subjective:   Ivette Jett is a 48 y.o. female who presents for Vomiting (Patient was seen 12/14/2023 and states that she is not feeling better. States that the pharmacy did not give her the antibiotic that was prescribed as it was not covered by her insurance. Needs a different antibiotic. ) and Diarrhea  This is a 48-year-old female who presents with persistent urinary burning urgency frequency, seen and evaluated on 1214 and prescribed antibiotics which she was not able to  due to insurance issues.  She also is concerned about dental infection and abdominal swelling.  Noted to have a unilateral inguinal hernia on previous visits.  She does endorse some constipation and abdominal distention.  She has been referred to general surgery.  Denies bloody stools.  She lives at the shelter.            Review of Systems   Gastrointestinal:  Positive for diarrhea and vomiting.       Medications:  cyclobenzaprine Tabs  ibuprofen Tabs  nitrofurantoin Caps    Allergies:             Patient has no known allergies.    Surgical History:       No past surgical history on file.    Past Social Hx:  Ivette Jett  reports that she has been smoking cigarettes. She has never used smokeless tobacco. She reports current alcohol use. She reports current drug use. Drugs: Marijuana and Inhaled.     Past Family Hx:   Ivette Jett family history is not on file.       Problem list, medications, and allergies reviewed by myself today in Epic.     Objective:     /68 (BP Location: Left arm, Patient Position: Sitting, BP Cuff Size: Adult)   Pulse 83   Temp 36.8 °C (98.2 °F) (Temporal)   Resp 12   Ht 1.638 m (5' 4.5\")   Wt 58.5 kg (129 lb)   SpO2 100%   BMI 21.80 kg/m²     Physical Exam  Vitals and nursing note reviewed.   Constitutional:       General: She is not in acute distress.     Appearance: Normal appearance. She is not ill-appearing.      Comments: Patient noted to be grooming in room " upon my entry.  She seems somewhat confused.   HENT:      Head: Normocephalic.   Eyes:      Extraocular Movements: Extraocular movements intact.      Pupils: Pupils are equal, round, and reactive to light.   Cardiovascular:      Rate and Rhythm: Normal rate.   Pulmonary:      Effort: Pulmonary effort is normal.   Skin:     General: Skin is warm.      Findings: No rash.   Neurological:      Mental Status: She is alert and oriented to person, place, and time.   Psychiatric:         Thought Content: Thought content normal.         Judgment: Judgment normal.       Results for orders placed or performed in visit on 12/18/23   POCT Urinalysis   Result Value Ref Range    POC Color yellow Negative    POC Appearance clear Negative    POC Glucose negative Negative mg/dL    POC Bilirubin negative Negative mg/dL    POC Ketones trace Negative mg/dL    POC Specific Gravity 1.030 <1.005 - >1.030    POC Blood large Negative    POC Urine PH 5.5 5.0 - 8.0    POC Protein 30 Negative mg/dL    POC Urobiligen 0.2 Negative (0.2) mg/dL    POC Nitrites negative Negative    POC Leukocyte Esterase trace Negative   POCT PREGNANCY   Result Value Ref Range    POC Urine Pregnancy Test Negative     Internal Control Positive Positive     Internal Control Negative Negative        Assessment/Plan:     Diagnosis and Associated Orders:     1. Dysuria  - POCT Urinalysis  - POCT PREGNANCY  - Referral to establish with PCP    2. Cystitis with hematuria  - sulfamethoxazole-trimethoprim (BACTRIM DS) 800-160 MG tablet; Take 1 Tablet by mouth 2 times a day for 7 days.  Dispense: 14 Tablet; Refill: 0  - Referral to establish with PCP    3. Nausea and vomiting, unspecified vomiting type  - ondansetron (ZOFRAN ODT) 4 MG TABLET DISPERSIBLE; Take 1 Tablet by mouth every 6 hours as needed for Nausea/Vomiting for up to 15 doses.  Dispense: 15 Tablet; Refill: 0  - Referral to establish with PCP        Comments/MDM:  Patient presents with persistent UTI symptoms,  abdominal bloating and pain.  Unable to  prescriptions after last visit.  Change antibiotic to Bactrim as she states there was an issue with previously prescribed antibiotic.  She does seem somewhat confused.  She is urinating in the room.  Her vital signs are stable and reassuring.  She has no CVA tenderness.  Abdomen is slightly distended and there is evidence of inguinal hernia that is reducible.  No peritoneal signs or overlying skin changes.  Advise follow-up with gastroenterology as was previously arranged.  Did convey she would benefit from a primary care physician.  Advised ER with worsening abdominal pain.  I personally reviewed prior external notes and test results pertinent to today's visit. Supportive care, natural history, differential diagnoses, and indications for immediate follow-up discussed. Return to clinic or go to ED if symptoms worsen or persist.  Red flag symptoms discussed.  Patient/Parent/Guardian voices understanding. Follow-up with your primary care provider in 3-5 days.  All side effects of medication discussed including allergic response, GI upset, tendon injury, rash, sedation etc    Please note that this dictation was created using voice recognition software. I have made a reasonable attempt to correct obvious errors, but I expect that there are errors of grammar and possibly content that I did not discover before finalizing the note.    This note was electronically signed by Jelly Jarquin PA-C

## 2023-12-25 ENCOUNTER — OFFICE VISIT (OUTPATIENT)
Dept: URGENT CARE | Facility: CLINIC | Age: 48
End: 2023-12-25
Payer: COMMERCIAL

## 2023-12-25 ENCOUNTER — HOSPITAL ENCOUNTER (OUTPATIENT)
Facility: MEDICAL CENTER | Age: 48
End: 2023-12-25
Payer: COMMERCIAL

## 2023-12-25 VITALS
WEIGHT: 125 LBS | TEMPERATURE: 97.3 F | BODY MASS INDEX: 20.83 KG/M2 | RESPIRATION RATE: 16 BRPM | SYSTOLIC BLOOD PRESSURE: 106 MMHG | HEIGHT: 65 IN | HEART RATE: 93 BPM | DIASTOLIC BLOOD PRESSURE: 68 MMHG | OXYGEN SATURATION: 97 %

## 2023-12-25 DIAGNOSIS — N89.8 VAGINAL IRRITATION: ICD-10-CM

## 2023-12-25 DIAGNOSIS — R30.0 DYSURIA: ICD-10-CM

## 2023-12-25 DIAGNOSIS — K08.89 TOOTH PAIN: ICD-10-CM

## 2023-12-25 LAB
APPEARANCE UR: CLEAR
BILIRUB UR STRIP-MCNC: NEGATIVE MG/DL
COLOR UR AUTO: YELLOW
GLUCOSE UR STRIP.AUTO-MCNC: NEGATIVE MG/DL
KETONES UR STRIP.AUTO-MCNC: NEGATIVE MG/DL
LEUKOCYTE ESTERASE UR QL STRIP.AUTO: NEGATIVE
NITRITE UR QL STRIP.AUTO: NEGATIVE
PH UR STRIP.AUTO: 5.5 [PH] (ref 5–8)
PROT UR QL STRIP: NEGATIVE MG/DL
RBC UR QL AUTO: NEGATIVE
SP GR UR STRIP.AUTO: >=1.03
UROBILINOGEN UR STRIP-MCNC: 0.2 MG/DL

## 2023-12-25 PROCEDURE — 87660 TRICHOMONAS VAGIN DIR PROBE: CPT

## 2023-12-25 PROCEDURE — 3074F SYST BP LT 130 MM HG: CPT

## 2023-12-25 PROCEDURE — 81002 URINALYSIS NONAUTO W/O SCOPE: CPT

## 2023-12-25 PROCEDURE — 3078F DIAST BP <80 MM HG: CPT

## 2023-12-25 PROCEDURE — 99213 OFFICE O/P EST LOW 20 MIN: CPT

## 2023-12-25 PROCEDURE — 87480 CANDIDA DNA DIR PROBE: CPT

## 2023-12-25 PROCEDURE — 87510 GARDNER VAG DNA DIR PROBE: CPT

## 2023-12-25 RX ORDER — AMOXICILLIN AND CLAVULANATE POTASSIUM 875; 125 MG/1; MG/1
1 TABLET, FILM COATED ORAL 2 TIMES DAILY
Qty: 28 TABLET | Refills: 0 | Status: SHIPPED | OUTPATIENT
Start: 2023-12-25 | End: 2024-01-08

## 2023-12-25 RX ORDER — SULFAMETHOXAZOLE AND TRIMETHOPRIM 800; 160 MG/1; MG/1
1 TABLET ORAL 2 TIMES DAILY
Qty: 14 TABLET | Refills: 0 | Status: SHIPPED | OUTPATIENT
Start: 2023-12-25 | End: 2023-12-25

## 2023-12-25 RX ORDER — AMOXICILLIN 500 MG/1
CAPSULE ORAL
COMMUNITY
Start: 2023-11-09 | End: 2024-01-23

## 2023-12-25 NOTE — PROGRESS NOTES
"Subjective     Ivette Jett is a 48 y.o. female who presents with Medication Refill and Requesting Labs            HPI patient she was seen earlier this month and placed on Bactrim.  She states the medication was helping, but then got wet and she was unable to finish it.  She planes of continued burning with urination and vaginal irritation  She is also complaining of tooth pain, has multiple teeth that are broken off at the gumline  She states she does live in a shelter and it is difficult keeping track of everything  She has been trying to get into see the dentist but has been unable to for quite some time    ROS Same as above      No Known Allergies    Current Outpatient Medications   Medication Sig    amoxicillin (AMOXIL) 500 MG Cap     amoxicillin-clavulanate (AUGMENTIN) 875-125 MG Tab Take 1 Tablet by mouth 2 times a day for 14 days.    ondansetron (ZOFRAN ODT) 4 MG TABLET DISPERSIBLE Take 1 Tablet by mouth every 6 hours as needed for Nausea/Vomiting for up to 15 doses.    ibuprofen (MOTRIN) 600 MG Tab Take 1 Tablet by mouth every 6 hours as needed for Moderate Pain.    cyclobenzaprine (FLEXERIL) 10 mg Tab Take 1 Tablet by mouth 3 times a day as needed for Muscle Spasms.        No past medical history on file.     Social History     Socioeconomic History    Marital status: Single   Tobacco Use    Smoking status: Every Day     Current packs/day: 0.25     Types: Cigarettes    Smokeless tobacco: Never   Vaping Use    Vaping Use: Never used   Substance and Sexual Activity    Alcohol use: Yes     Comment: whisky to ease the pain    Drug use: Yes     Types: Marijuana, Inhaled    Sexual activity: Not Currently          Objective     /68   Pulse 93   Temp 36.3 °C (97.3 °F)   Resp 16   Ht 1.638 m (5' 4.5\")   Wt 56.7 kg (125 lb)   SpO2 97%   BMI 21.12 kg/m²      Physical Exam  Vitals reviewed.   Constitutional:       Appearance: Normal appearance. She is not ill-appearing.   HENT:      Head: " Normocephalic and atraumatic.      Nose: No congestion.      Mouth/Throat:      Mouth: Mucous membranes are moist.      Dentition: Abnormal dentition. Dental tenderness and dental caries present. No gum lesions.      Pharynx: Uvula midline. No pharyngeal swelling, oropharyngeal exudate, posterior oropharyngeal erythema or uvula swelling.   Eyes:      Conjunctiva/sclera: Conjunctivae normal.   Cardiovascular:      Rate and Rhythm: Normal rate.   Pulmonary:      Effort: Pulmonary effort is normal.   Musculoskeletal:         General: Normal range of motion.      Cervical back: Normal range of motion and neck supple.   Lymphadenopathy:      Head:      Right side of head: Tonsillar adenopathy present. No submental, submandibular, preauricular, posterior auricular or occipital adenopathy.      Left side of head: Tonsillar adenopathy present. No submental, submandibular, preauricular, posterior auricular or occipital adenopathy.      Cervical: Cervical adenopathy present.      Right cervical: Superficial cervical adenopathy present. No deep or posterior cervical adenopathy.     Left cervical: Superficial cervical adenopathy present. No deep or posterior cervical adenopathy.   Skin:     General: Skin is warm and dry.   Neurological:      Mental Status: She is alert and oriented to person, place, and time.         Assessment & Plan      Patient comes in with concerns for tooth pain and vaginal pain and irritation.  She states she was seen earlier this month and placed on Bactrim, she does state that the medication was helping, but she was unable to finish it as it did get wet and disintegrated.    UA in clinic today negative for acute cystitis.  Discussed vaginal path with patient to check for yeast and BV, patient agreeable.  Verified with patient that there is standing lab orders for her to complete, patient states she will do this later this week.  On exam she has multiple teeth broken off at the gumline, with obvious  dental caries.  There is sensitivity to multiple teeth with tapping.  There is tonsillar and superficial cervical adenopathy bilaterally, nontender with palpation.  We did discuss placing her on 14 days of amoxicillin/clavulanate due to her multiple dental caries and tooth discomfort.  Patient will continue trying to get in to the dental clinic.  Let patient know we will update her via phone once the results from the vaginal pathogen screening is available, any medications needed will be called into the pharmacy.  Patient verbalized understanding and agreement with plan of care.     1. Tooth pain  amoxicillin-clavulanate (AUGMENTIN) 875-125 MG Tab    DISCONTINUED: sulfamethoxazole-trimethoprim (BACTRIM DS) 800-160 MG tablet      2. Dysuria  POCT Urinalysis    VAGINAL PATHOGENS DNA PANEL    DISCONTINUED: sulfamethoxazole-trimethoprim (BACTRIM DS) 800-160 MG tablet      3. Vaginal irritation  POCT Urinalysis    VAGINAL PATHOGENS DNA PANEL    DISCONTINUED: sulfamethoxazole-trimethoprim (BACTRIM DS) 800-160 MG tablet

## 2023-12-26 LAB
CANDIDA DNA VAG QL PROBE+SIG AMP: NEGATIVE
G VAGINALIS DNA VAG QL PROBE+SIG AMP: NEGATIVE
T VAGINALIS DNA VAG QL PROBE+SIG AMP: NEGATIVE

## 2024-01-12 ENCOUNTER — HOSPITAL ENCOUNTER (EMERGENCY)
Facility: MEDICAL CENTER | Age: 49
End: 2024-01-13
Attending: EMERGENCY MEDICINE
Payer: COMMERCIAL

## 2024-01-12 VITALS
HEART RATE: 88 BPM | WEIGHT: 125 LBS | HEIGHT: 64 IN | BODY MASS INDEX: 21.34 KG/M2 | TEMPERATURE: 97.5 F | OXYGEN SATURATION: 99 % | DIASTOLIC BLOOD PRESSURE: 96 MMHG | RESPIRATION RATE: 20 BRPM | SYSTOLIC BLOOD PRESSURE: 159 MMHG

## 2024-01-12 PROCEDURE — 302449 STATCHG TRIAGE ONLY (STATISTIC)

## 2024-01-13 LAB
FLUAV RNA SPEC QL NAA+PROBE: NEGATIVE
FLUBV RNA SPEC QL NAA+PROBE: NEGATIVE
RSV RNA SPEC QL NAA+PROBE: NEGATIVE
SARS-COV-2 RNA RESP QL NAA+PROBE: NOTDETECTED

## 2024-01-13 PROCEDURE — 0241U HCHG SARS-COV-2 COVID-19 NFCT DS RESP RNA 4 TRGT ED POC: CPT

## 2024-01-13 PROCEDURE — 302449 STATCHG TRIAGE ONLY (STATISTIC)

## 2024-01-13 ASSESSMENT — PAIN DESCRIPTION - PAIN TYPE: TYPE: ACUTE PAIN

## 2024-01-13 NOTE — ED TRIAGE NOTES
"Ivettedenisse Jett  48 y.o.  female      Chief Complaint   Patient presents with    Flu Like Symptoms     Pt reports cough, chills, fever, and sore throat; onset today. Pt also reports her lower back hurts when she coughs. Pt denies being around anyone who has been sick recently.        Pt to triage for above compliant. Covid test ordered    Pt placed in lobby and educated on triage process. Pt encouraged to alert staff for any changes, pt verbalized understanding.     BP (!) 159/96   Pulse 88   Temp 36.4 °C (97.5 °F) (Temporal)   Resp 20   Ht 1.626 m (5' 4\")   Wt 56.7 kg (125 lb)   LMP 12/28/2023   SpO2 99%   BMI 21.46 kg/m²     "

## 2024-01-13 NOTE — ED NOTES
"Attempted to swab pt for covid test, pt unable to tolerate. Pt moving head away and reporting she \"can't handle that shit\" in her nose. Pt educated on need to collect specimen, pt continuing to refuse.   "

## 2024-01-23 ENCOUNTER — HOSPITAL ENCOUNTER (OUTPATIENT)
Facility: MEDICAL CENTER | Age: 49
End: 2024-01-23
Attending: NURSE PRACTITIONER
Payer: COMMERCIAL

## 2024-01-23 ENCOUNTER — OFFICE VISIT (OUTPATIENT)
Dept: URGENT CARE | Facility: CLINIC | Age: 49
End: 2024-01-23
Payer: COMMERCIAL

## 2024-01-23 VITALS
BODY MASS INDEX: 21.46 KG/M2 | OXYGEN SATURATION: 98 % | DIASTOLIC BLOOD PRESSURE: 80 MMHG | SYSTOLIC BLOOD PRESSURE: 126 MMHG | TEMPERATURE: 98.3 F | RESPIRATION RATE: 12 BRPM | HEART RATE: 90 BPM | WEIGHT: 125 LBS

## 2024-01-23 DIAGNOSIS — J06.9 VIRAL UPPER RESPIRATORY TRACT INFECTION WITH COUGH: ICD-10-CM

## 2024-01-23 DIAGNOSIS — R39.9 URINARY SYMPTOM OR SIGN: ICD-10-CM

## 2024-01-23 DIAGNOSIS — K04.7 DENTAL INFECTION: ICD-10-CM

## 2024-01-23 LAB
APPEARANCE UR: CLEAR
BILIRUB UR STRIP-MCNC: NEGATIVE MG/DL
COLOR UR AUTO: NORMAL
GLUCOSE UR STRIP.AUTO-MCNC: NEGATIVE MG/DL
KETONES UR STRIP.AUTO-MCNC: NEGATIVE MG/DL
LEUKOCYTE ESTERASE UR QL STRIP.AUTO: NEGATIVE
NITRITE UR QL STRIP.AUTO: NORMAL
PH UR STRIP.AUTO: 5.5 [PH] (ref 5–8)
PROT UR QL STRIP: 30 MG/DL
RBC UR QL AUTO: NEGATIVE
SP GR UR STRIP.AUTO: >=1.03
UROBILINOGEN UR STRIP-MCNC: 0.2 MG/DL

## 2024-01-23 PROCEDURE — 3079F DIAST BP 80-89 MM HG: CPT | Performed by: NURSE PRACTITIONER

## 2024-01-23 PROCEDURE — 81002 URINALYSIS NONAUTO W/O SCOPE: CPT | Performed by: NURSE PRACTITIONER

## 2024-01-23 PROCEDURE — 3074F SYST BP LT 130 MM HG: CPT | Performed by: NURSE PRACTITIONER

## 2024-01-23 PROCEDURE — 87086 URINE CULTURE/COLONY COUNT: CPT

## 2024-01-23 PROCEDURE — 99214 OFFICE O/P EST MOD 30 MIN: CPT | Performed by: NURSE PRACTITIONER

## 2024-01-23 RX ORDER — AMOXICILLIN 500 MG/1
500 CAPSULE ORAL 2 TIMES DAILY
Qty: 14 CAPSULE | Refills: 0 | Status: SHIPPED | OUTPATIENT
Start: 2024-01-23 | End: 2024-01-30

## 2024-01-23 RX ORDER — BENZONATATE 100 MG/1
100 CAPSULE ORAL 3 TIMES DAILY PRN
Qty: 60 CAPSULE | Refills: 0 | Status: SHIPPED | OUTPATIENT
Start: 2024-01-23

## 2024-01-23 NOTE — PROGRESS NOTES
Chief Complaint   Patient presents with    Cough     Cough, marilia, mucus x 1 week       HISTORY OF PRESENT ILLNESS: Patient is a pleasant 48 y.o. female who presents today due to symptoms which started 2-3 weeks ago.  Patient reports a productive cough.  She has not tried any medication over-the-counter for symptom relief.  Denies any fever.  Furthermore, for the last week or so she has had urinary pressure and dysuria.  She is concerned about urinary tract infection today.  Lastly, patient notes several weeks of dental pain.  She has yet to be able to afford to see a dentist.      There are no problems to display for this patient.      Allergies:Patient has no known allergies.    Current Outpatient Medications Ordered in Epic   Medication Sig Dispense Refill    benzonatate (TESSALON) 100 MG Cap Take 1 Capsule by mouth 3 times a day as needed for Cough. 60 Capsule 0    amoxicillin (AMOXIL) 500 MG Cap Take 1 Capsule by mouth 2 times a day for 7 days. 14 Capsule 0     No current Epic-ordered facility-administered medications on file.       History reviewed. No pertinent past medical history.    Social History     Tobacco Use    Smoking status: Every Day     Current packs/day: 0.25     Types: Cigarettes    Smokeless tobacco: Never   Vaping Use    Vaping Use: Never used   Substance Use Topics    Alcohol use: Yes     Comment: whisky to ease the pain    Drug use: Yes     Types: Marijuana, Inhaled       No family status information on file.   History reviewed. No pertinent family history.    ROS:  Review of Systems   Constitutional: Negative for fever, chills, fatigue, malaise. Negative for weight loss.  HENT: Positive for dental pain. Negative for ear pain, nosebleeds, and neck pain.    Eyes: Negative for vision changes.   Cardiovascular: Negative for chest pain, palpitations, orthopnea and leg swelling.   Respiratory: Positive for cough. Negative for shortness of breath and wheezing.   Gastrointestinal: Negative for  abdominal pain, nausea, vomiting or diarrhea.   : Positive for dysuria and urgency.  Skin: Negative for rash, diaphoresis.     Exam:  /80   Pulse 90   Temp 36.8 °C (98.3 °F) (Temporal)   Resp 12   Wt 56.7 kg (125 lb)   SpO2 98%   General: well-nourished, well-developed female in NAD  Head: normocephalic, atraumatic  Eyes: PERRLA, EOM within normal limits, no conjunctival injection, no scleral icterus, visual fields and acuity grossly intact.  Ears: normal shape and symmetry, no tenderness, no discharge. External canals are without any significant edema or erythema. Tympanic membranes are without any inflammation, no effusion. Gross auditory acuity is intact.  Nose: symmetrical without tenderness, mild discharge, erythema present bilateral nares.  Mouth/Throat: reasonable hygiene, no exudates or tonsillar enlargement. Mild erythema present.  Scattered caries throughout with some gumline erythema.  No signs of abscess.  Neck: no masses, range of motion within normal limits, no tracheal deviation.  Lymph: mild cervical adenopathy. No supraclavicular adenopathy.   Neuro: alert and oriented. Cranial nerves 1-12 grossly intact.   Cardiovascular: regular rate and rhythm without murmurs, rubs, or gallops. No edema.   Pulmonary: no distress. Chest is symmetrical with respiration. No wheezes, crackles, or rhonchi.   Musculoskeletal: appropriate muscle tone, gait is stable.  GI: Soft, nontender, no CVA tenderness.  Skin: warm, dry, intact, no clubbing, no cyanosis.   Psych: appropriate mood, affect, judgement.         Assessment/Plan:  1. Viral upper respiratory tract infection with cough  benzonatate (TESSALON) 100 MG Cap      2. Urinary symptom or sign  POCT Urinalysis    URINE CULTURE(NEW)      3. Dental infection  amoxicillin (AMOXIL) 500 MG Cap              Discussed URI symptoms most likely viral, self limiting illness. Did not see any evidence of a bacterial process. Discussed natural progression and sx  care.  Rest, increase fluids, hand and respiratory hygiene.  Tessalon pearls for cough.  Regarding her urine, there is protein in her urine, otherwise negative for signs of infectious process, will send for culture.  Regarding her teeth, there are signs of infectious dental infections.  Amoxicillin instructed, instructed to follow-up with a dentist soon as possible.  May take OTC medications as directed for symptom relief.   Supportive care, differential diagnoses, and indications for immediate follow-up discussed with patient.   Pathogenesis of diagnosis discussed including typical length and natural progression.  Instructed to return to clinic or nearest emergency department for any change in condition, further concerns, or worsening of symptoms.  Patient states understanding of the plan of care and discharge instructions.  Instructed to make an appointment with her primary care provider in the next 3-5 days if not significantly improving and for further care.         Please note that this dictation was created using voice recognition software. I have made every reasonable attempt to correct obvious errors, but I expect that there are errors of grammar and possibly content that I did not discover before finalizing the note.  I spent a total of 31 minutes with record review, exam, communication with the patient, and documentation of this encounter.      MARITZA Pitts.

## 2024-01-25 LAB
BACTERIA UR CULT: NORMAL
SIGNIFICANT IND 70042: NORMAL
SITE SITE: NORMAL
SOURCE SOURCE: NORMAL

## 2024-02-20 ENCOUNTER — NON-PROVIDER VISIT (OUTPATIENT)
Dept: OBGYN | Facility: CLINIC | Age: 49
End: 2024-02-20
Payer: COMMERCIAL

## 2024-02-20 DIAGNOSIS — Z32.02 PREGNANCY EXAMINATION OR TEST, NEGATIVE RESULT: ICD-10-CM

## 2024-02-20 LAB
POCT INT CON NEG: NEGATIVE
POCT INT CON POS: POSITIVE
POCT URINE PREGNANCY TEST: NEGATIVE

## 2024-02-20 PROCEDURE — 81025 URINE PREGNANCY TEST: CPT | Performed by: NURSE PRACTITIONER

## 2024-03-09 ENCOUNTER — OFFICE VISIT (OUTPATIENT)
Dept: URGENT CARE | Facility: CLINIC | Age: 49
End: 2024-03-09
Payer: COMMERCIAL

## 2024-03-09 VITALS
SYSTOLIC BLOOD PRESSURE: 122 MMHG | OXYGEN SATURATION: 100 % | TEMPERATURE: 97.9 F | WEIGHT: 122.2 LBS | HEART RATE: 71 BPM | RESPIRATION RATE: 16 BRPM | HEIGHT: 64 IN | BODY MASS INDEX: 20.86 KG/M2 | DIASTOLIC BLOOD PRESSURE: 88 MMHG

## 2024-03-09 DIAGNOSIS — J03.00 STREPTOCOCCAL TONSILLITIS: ICD-10-CM

## 2024-03-09 DIAGNOSIS — Z32.02 ENCOUNTER FOR PREGNANCY TEST, RESULT NEGATIVE: ICD-10-CM

## 2024-03-09 LAB
FLUAV RNA SPEC QL NAA+PROBE: NEGATIVE
FLUBV RNA SPEC QL NAA+PROBE: NEGATIVE
POCT INT CON NEG: NEGATIVE
POCT INT CON POS: POSITIVE
POCT URINE PREGNANCY TEST: NEGATIVE
RSV RNA SPEC QL NAA+PROBE: NEGATIVE
S PYO DNA SPEC NAA+PROBE: DETECTED
SARS-COV-2 RNA RESP QL NAA+PROBE: NEGATIVE

## 2024-03-09 PROCEDURE — 87637 SARSCOV2&INF A&B&RSV AMP PRB: CPT | Mod: QW | Performed by: STUDENT IN AN ORGANIZED HEALTH CARE EDUCATION/TRAINING PROGRAM

## 2024-03-09 PROCEDURE — 87651 STREP A DNA AMP PROBE: CPT | Performed by: STUDENT IN AN ORGANIZED HEALTH CARE EDUCATION/TRAINING PROGRAM

## 2024-03-09 PROCEDURE — 3079F DIAST BP 80-89 MM HG: CPT | Performed by: STUDENT IN AN ORGANIZED HEALTH CARE EDUCATION/TRAINING PROGRAM

## 2024-03-09 PROCEDURE — 81025 URINE PREGNANCY TEST: CPT | Performed by: STUDENT IN AN ORGANIZED HEALTH CARE EDUCATION/TRAINING PROGRAM

## 2024-03-09 PROCEDURE — 99213 OFFICE O/P EST LOW 20 MIN: CPT | Mod: 25 | Performed by: STUDENT IN AN ORGANIZED HEALTH CARE EDUCATION/TRAINING PROGRAM

## 2024-03-09 PROCEDURE — 3074F SYST BP LT 130 MM HG: CPT | Performed by: STUDENT IN AN ORGANIZED HEALTH CARE EDUCATION/TRAINING PROGRAM

## 2024-03-09 RX ORDER — DEXAMETHASONE SODIUM PHOSPHATE 10 MG/ML
10 INJECTION INTRAMUSCULAR; INTRAVENOUS ONCE
Status: COMPLETED | OUTPATIENT
Start: 2024-03-09 | End: 2024-03-09

## 2024-03-09 RX ORDER — AMOXICILLIN 500 MG/1
1000 CAPSULE ORAL DAILY
Qty: 20 CAPSULE | Refills: 0 | Status: SHIPPED | OUTPATIENT
Start: 2024-03-09 | End: 2024-03-19

## 2024-03-09 RX ADMIN — DEXAMETHASONE SODIUM PHOSPHATE 10 MG: 10 INJECTION INTRAMUSCULAR; INTRAVENOUS at 11:22

## 2024-03-09 NOTE — PROGRESS NOTES
"Subjective:   Ivette Jett is a 49 y.o. female who presents for Pharyngitis (1.5 days, sore throat, lower back pain, green mucus, congestion, headache, fever, nausea)      HPI:  49-year-old female presents to the urgent care for just over 24 hours of sore throat, body aches, cough, nasal congestion, headache, feeling feverish, and some nausea but no vomiting.  Nausea has resolved at this time.  No one else at home with similar symptoms.  No abdominal pain, chest pain, shortness of breath, wheezing, dizziness, headache, or ear pain.      Medications:    benzonatate Caps    Allergies: Patient has no known allergies.    Problem List: Ivette Jett does not have a problem list on file.    Surgical History:  No past surgical history on file.    Past Social Hx: Ivette Jett  reports that she has been smoking cigarettes. She has never used smokeless tobacco. She reports current alcohol use. She reports current drug use. Drugs: Marijuana and Inhaled.     Past Family Hx:  Ivette Jett family history is not on file.     Problem list, medications, and allergies reviewed by myself today in Epic.     Objective:     /88 (BP Location: Left arm, Patient Position: Sitting, BP Cuff Size: Adult)   Pulse 71   Temp 36.6 °C (97.9 °F) (Temporal)   Resp 16   Ht 1.626 m (5' 4\")   Wt 55.4 kg (122 lb 3.2 oz)   SpO2 100%   BMI 20.98 kg/m²     Physical Exam  Vitals reviewed.   Constitutional:       Appearance: Normal appearance.   HENT:      Right Ear: Tympanic membrane, ear canal and external ear normal.      Left Ear: Tympanic membrane, ear canal and external ear normal.      Nose: Nose normal.      Mouth/Throat:      Mouth: Mucous membranes are moist.      Pharynx: Posterior oropharyngeal erythema present. No oropharyngeal exudate.   Cardiovascular:      Rate and Rhythm: Normal rate and regular rhythm.      Pulses: Normal pulses.      Heart sounds: Normal heart sounds. No murmur " heard.  Pulmonary:      Effort: Pulmonary effort is normal. No respiratory distress.      Breath sounds: Normal breath sounds. No stridor. No wheezing, rhonchi or rales.   Musculoskeletal:      Cervical back: Normal range of motion.   Lymphadenopathy:      Cervical: Cervical adenopathy present.   Neurological:      Mental Status: She is alert.         Lab Results/POC Test Results   Results for orders placed or performed in visit on 03/09/24   POCT GROUP A STREP, PCR   Result Value Ref Range    POC Group A Strep, PCR Detected (A) Not Detected, Invalid   POCT CoV-2, Flu A/B, RSV by PCR   Result Value Ref Range    SARS-CoV-2 by PCR Negative Negative, Invalid    Influenza virus A RNA Negative Negative, Invalid    Influenza virus B, PCR Negative Negative, Invalid    RSV, PCR Negative Negative, Invalid   POCT PREGNANCY   Result Value Ref Range    POC Urine Pregnancy Test Negative     Internal Control Positive Positive     Internal Control Negative Negative            Assessment/Plan:     Diagnosis and associated orders:     1. Streptococcal tonsillitis  POCT GROUP A STREP, PCR    POCT CoV-2, Flu A/B, RSV by PCR    dexamethasone (Decadron) injection (check route below) 10 mg    amoxicillin (AMOXIL) 500 MG Cap      2. Encounter for pregnancy test, result negative  POCT PREGNANCY         Comments/MDM:     PCR group A strep positive.  Patient's presentation physical exam findings are consistent with this diagnosis.  Decadron given in clinic for symptomatic relief.  Amoxicillin for treatment.  Patient is also requesting pregnancy test at this time.  Denies any missed periods, abdominal pain, or vaginal bleeding.  States that she did have a recent sexual encounter and would like this performed.  hCG negative.  All questions answered.  Denies any abdominal pain.  No red flag signs.  Vitals are stable.  No shortness of breath or chest pain.  No signs of pneumonia on exam.  Return precautions given.         Differential diagnosis,  natural history, supportive care, and indications for immediate follow-up discussed.    Advised the patient to follow-up with the primary care physician for recheck, reevaluation, and consideration of further management.    Please note that this dictation was created using voice recognition software. I have made a reasonable attempt to correct obvious errors, but I expect that there are errors of grammar and possibly content that I did not discover before finalizing the note.    Electronically signed by Mark Mendoza PA-C.

## 2024-04-11 ENCOUNTER — OFFICE VISIT (OUTPATIENT)
Dept: URGENT CARE | Facility: CLINIC | Age: 49
End: 2024-04-11
Payer: COMMERCIAL

## 2024-04-11 ENCOUNTER — HOSPITAL ENCOUNTER (OUTPATIENT)
Facility: MEDICAL CENTER | Age: 49
End: 2024-04-11
Payer: COMMERCIAL

## 2024-04-11 VITALS
WEIGHT: 116.4 LBS | BODY MASS INDEX: 19.39 KG/M2 | SYSTOLIC BLOOD PRESSURE: 136 MMHG | RESPIRATION RATE: 16 BRPM | HEIGHT: 65 IN | TEMPERATURE: 98.3 F | OXYGEN SATURATION: 97 % | HEART RATE: 79 BPM | DIASTOLIC BLOOD PRESSURE: 80 MMHG

## 2024-04-11 DIAGNOSIS — N89.8 VAGINAL DISCHARGE: ICD-10-CM

## 2024-04-11 DIAGNOSIS — N73.0 PID (ACUTE PELVIC INFLAMMATORY DISEASE): ICD-10-CM

## 2024-04-11 DIAGNOSIS — R10.2 PELVIC PAIN: ICD-10-CM

## 2024-04-11 DIAGNOSIS — B96.89 ACUTE BACTERIAL SINUSITIS: ICD-10-CM

## 2024-04-11 DIAGNOSIS — N89.8 VAGINAL ITCHING: ICD-10-CM

## 2024-04-11 DIAGNOSIS — Z20.2 POSSIBLE EXPOSURE TO SEXUALLY TRANSMITTED INFECTION: Primary | ICD-10-CM

## 2024-04-11 DIAGNOSIS — J01.90 ACUTE BACTERIAL SINUSITIS: ICD-10-CM

## 2024-04-11 DIAGNOSIS — K40.90 UNILATERAL INGUINAL HERNIA WITHOUT OBSTRUCTION OR GANGRENE, RECURRENCE NOT SPECIFIED: ICD-10-CM

## 2024-04-11 PROCEDURE — 87510 GARDNER VAG DNA DIR PROBE: CPT

## 2024-04-11 PROCEDURE — 99214 OFFICE O/P EST MOD 30 MIN: CPT

## 2024-04-11 PROCEDURE — 87480 CANDIDA DNA DIR PROBE: CPT

## 2024-04-11 PROCEDURE — 87660 TRICHOMONAS VAGIN DIR PROBE: CPT

## 2024-04-11 PROCEDURE — 87591 N.GONORRHOEAE DNA AMP PROB: CPT

## 2024-04-11 PROCEDURE — 87491 CHLMYD TRACH DNA AMP PROBE: CPT

## 2024-04-11 RX ORDER — DOXYCYCLINE HYCLATE 100 MG
100 TABLET ORAL 2 TIMES DAILY
Qty: 14 TABLET | Refills: 0 | Status: SHIPPED | OUTPATIENT
Start: 2024-04-11 | End: 2024-04-11

## 2024-04-11 RX ORDER — DOXYCYCLINE HYCLATE 100 MG
100 TABLET ORAL 2 TIMES DAILY
Qty: 28 TABLET | Refills: 0 | Status: SHIPPED | OUTPATIENT
Start: 2024-04-11 | End: 2024-04-25

## 2024-04-11 RX ORDER — METRONIDAZOLE 500 MG/1
500 TABLET ORAL 2 TIMES DAILY
Qty: 28 TABLET | Refills: 0 | Status: SHIPPED | OUTPATIENT
Start: 2024-04-11 | End: 2024-04-25

## 2024-04-11 NOTE — PROGRESS NOTES
Subjective:   Ivette Jett is a 49 y.o. female who presents for Sexually Transmitted Diseases (Would like to get tested for STDs including syphilis. Vaginal itching/), Nasal Congestion (Congestion has been present all winter and it's discolored.), Ear Fullness (Left ear feels full.), and Pharyngitis (Left side of the throat was painful. Today it isn't bothering her, but she still wants it checked.)          I introduced myself to the patient and informed them that I am a family nurse practitioner.    HPI: Ivette is a 49-year-old homeless lady who states that she lives at the homeless shelter in [facility], comes in today c/o sinus infection x 3 months, fever, dysuria, pelvic pain, thick yellow vaginal discharge, vaginal itching x 1 month, left ear fullness and pressure.  Patient describes symptoms as constant. They describe the pain as headache and aching in her sinuses, sharp lower abdominal pain intermittently, burning when she passes urine. Aggravating factors include passing urine, sexual intercourse exacerbates the pelvic pain, blowing her nose and bending forward exacerbates the sinus pain. Relieving factors include none. Treatments tried at home include she has taken some Tylenol with little relief. They describe their symptoms as moderate.  She does endorse recent unprotected sexual intercourse and risky sexual activities, and would like to be tested for STIs.  She states she did have a negative pregnancy test a few weeks ago, just had her period and there is no chance that she could be pregnant presently    Also has been diagnosed with a R inguinal hernia in the past,  she states is getting bigger, is starting to become uncomfortable and annoying and she wants it fixed, asking for referral to surgery    Patient is extremely talkative during her appointment today, is going off on multiple tangents, I have to constantly refocus and redirect her to answer questions    Review of Systems  "  Constitutional:  Positive for chills and malaise/fatigue.   HENT:  Positive for congestion and sinus pain. Negative for ear discharge, ear pain, hearing loss and sore throat.    Eyes:  Negative for blurred vision, double vision, photophobia, pain, discharge and redness.   Respiratory:  Negative for cough, hemoptysis, sputum production, shortness of breath and wheezing.    Cardiovascular:  Negative for chest pain and palpitations.   Gastrointestinal:  Positive for abdominal pain. Negative for blood in stool, constipation, diarrhea, heartburn, melena, nausea and vomiting.        Positive for right inguinal hernia   Genitourinary:  Positive for dysuria, frequency and urgency. Negative for flank pain and hematuria.   Musculoskeletal:  Negative for myalgias.   Skin:  Negative for rash.   Neurological:  Positive for headaches. Negative for dizziness.   Psychiatric/Behavioral:  Negative for depression, substance abuse and suicidal ideas. The patient is not nervous/anxious.    All other systems reviewed and are negative.      Medications: benzonatate Caps     Allergies: Patient has no known allergies.    Problem List: does not have a problem list on file.    Surgical History:  No past surgical history on file.    Past Social Hx:   reports that she has been smoking cigarettes. She has never used smokeless tobacco. She reports current alcohol use. She reports current drug use. Drugs: Marijuana and Inhaled.     Past Family Hx:   family history is not on file.     Problem list, medications, and allergies reviewed by myself today in Epic.   I have documented what I find to be significant in regards to past medical, social, family and surgical history  in my HPI or under PMH/PSH/FH review section, otherwise it is noncontributory     Objective:     /80 (BP Location: Left arm, Patient Position: Sitting, BP Cuff Size: Small adult)   Pulse 79   Temp 36.8 °C (98.3 °F) (Temporal)   Resp 16   Ht 1.638 m (5' 4.5\")   Wt 52.8 " kg (116 lb 6.4 oz)   SpO2 97%   BMI 19.67 kg/m²     During this visit, appropriate PPE was worn, and hand hygiene was performed.    Physical Exam  Vitals reviewed.   Constitutional:       General: She is not in acute distress.     Appearance: Normal appearance. She is not ill-appearing, toxic-appearing or diaphoretic.   HENT:      Head: Normocephalic and atraumatic.      Right Ear: Tympanic membrane, ear canal and external ear normal. There is no impacted cerumen.      Left Ear: Tympanic membrane, ear canal and external ear normal. There is no impacted cerumen.      Nose: Congestion and rhinorrhea present. Rhinorrhea is purulent.      Right Turbinates: Swollen.      Left Turbinates: Swollen.      Right Sinus: Maxillary sinus tenderness and frontal sinus tenderness present.      Left Sinus: Maxillary sinus tenderness and frontal sinus tenderness present.      Mouth/Throat:      Mouth: Mucous membranes are moist.      Pharynx: No oropharyngeal exudate or posterior oropharyngeal erythema.   Eyes:      General: No scleral icterus.        Right eye: No discharge.         Left eye: No discharge.      Extraocular Movements: Extraocular movements intact.      Conjunctiva/sclera: Conjunctivae normal.      Pupils: Pupils are equal, round, and reactive to light.   Cardiovascular:      Rate and Rhythm: Normal rate and regular rhythm.      Heart sounds: Normal heart sounds. No murmur heard.     No friction rub. No gallop.   Pulmonary:      Effort: No respiratory distress.      Breath sounds: Normal breath sounds. No stridor. No wheezing, rhonchi or rales.   Chest:      Chest wall: No tenderness.   Abdominal:      General: Abdomen is flat. Bowel sounds are normal. There is no distension.      Palpations: Abdomen is soft. There is no hepatomegaly, splenomegaly or mass.      Tenderness: There is abdominal tenderness in the suprapubic area. There is no right CVA tenderness, left CVA tenderness, guarding or rebound. Negative signs  include Abbott's sign, Rovsing's sign and McBurney's sign.      Hernia: No hernia is present.          Comments: There is a moderate sized right inguinal hernia evident, reducible, no signs of being strangulated, no signs of infection at the area   Genitourinary:     Comments: Deferred  Musculoskeletal:         General: Normal range of motion.      Cervical back: Normal range of motion. No rigidity or tenderness.   Lymphadenopathy:      Cervical: No cervical adenopathy.   Skin:     General: Skin is warm and dry.   Neurological:      General: No focal deficit present.      Mental Status: She is alert and oriented to person, place, and time. Mental status is at baseline.   Psychiatric:         Mood and Affect: Mood normal.         Behavior: Behavior normal.         Assessment/Plan:     Diagnosis and associated orders:     1. Possible exposure to sexually transmitted infection  metroNIDAZOLE (FLAGYL) 500 MG Tab    Chlamydia/GC, PCR (Genital/Anal swab)    HIV AG/AB Combo Assay Screening    T.Pallidum AB ROSIE (Screening)    Trichomonas Vaginalis by TMA    Hepatitis C Virus Antibody    HEP B Surface Antibody    Hep B Core AB Total    Hep B Surface Antigen    Comp Metabolic Panel    CANCELED: Basic Metabolic Panel      2. Acute bacterial sinusitis  doxycycline (VIBRAMYCIN) 100 MG Tab    DISCONTINUED: doxycycline (VIBRAMYCIN) 100 MG Tab      3. Vaginal itching  VAGINAL PATHOGENS DNA PANEL    POCT Urinalysis    URINE CULTURE(NEW)    Chlamydia/GC, PCR (Genital/Anal swab)    metroNIDAZOLE (FLAGYL) 500 MG Tab      4. Pelvic pain  VAGINAL PATHOGENS DNA PANEL    POCT Urinalysis    URINE CULTURE(NEW)    Chlamydia/GC, PCR (Genital/Anal swab)    doxycycline (VIBRAMYCIN) 100 MG Tab    metroNIDAZOLE (FLAGYL) 500 MG Tab    DISCONTINUED: cefTRIAXone (Rocephin) 500 mg in lidocaine (Xylocaine) 1 % 2 mL for IM use      5. Vaginal discharge  VAGINAL PATHOGENS DNA PANEL    POCT Urinalysis    URINE CULTURE(NEW)    Chlamydia/GC, PCR  (Genital/Anal swab)    metroNIDAZOLE (FLAGYL) 500 MG Tab    DISCONTINUED: cefTRIAXone (Rocephin) 500 mg in lidocaine (Xylocaine) 1 % 2 mL for IM use      6. Unilateral inguinal hernia without obstruction or gangrene, recurrence not specified  Referral to General Surgery      7. PID (acute pelvic inflammatory disease)  doxycycline (VIBRAMYCIN) 100 MG Tab    metroNIDAZOLE (FLAGYL) 500 MG Tab    Chlamydia/GC, PCR (Genital/Anal swab)    HIV AG/AB Combo Assay Screening    T.Pallidum AB ROSIE (Screening)    Trichomonas Vaginalis by TMA    Hepatitis C Virus Antibody    HEP B Surface Antibody    Hep B Core AB Total    Hep B Surface Antigen    Comp Metabolic Panel    DISCONTINUED: cefTRIAXone (Rocephin) 500 mg in lidocaine (Xylocaine) 1 % 2 mL for IM use    CANCELED: Basic Metabolic Panel         Comments/MDM:     1. Acute bacterial sinusitis  Patient meets Infectious Disease Society of [place] (IDSA)  guidelines for ABRS given duration of symptoms, worsening severity, clinical history and physical exam   We discussed management of sinus infection including -  - supportive care measures such as drinking plenty of fluids, use a humidifier in room at night to keep mucous membranes moist, applying warm compresses to face and forehead may be useful in relieving sinus pain and pressure, using a sinus wash such as the NeilMed Neti bottle several times a day as needed, Flonase daily, OTC second-generation non-sedating antihistamine such as Zyrtec, Allegra, or Loratadine daily, alternate Tylenol with ibuprofen (unless contraindicated) for pain and fever.  OTC decongestant of choice. Follow manufacturers guidelines for dosing and safety precautions.   -We did discuss red flags and reasons to return to urgent care versus when to go to the ER.    Discussed DDx, management options (risks,benefits, and alternatives to planned treatment), natural progression.  Questions were encouraged and answered. Written information was provided and I  did go over this with the patient in clinic today.  Instructed patient regarding red flags and to return to urgent care prn if new or worsening sx or if there is no improvement in condition prn.    Advised the patient to follow-up with the primary care physician for recheck, reevaluation, and consideration of further management.  I did instruct patient regarding medications prescribed, purpose, side effects, cautions.  Instructed patient to get a pharmacy consult when picking up any prescribed medications.  Strict ER precautions discussed for any mastoid pain, swelling of the face or around the eyes, visual disturbances, eye pain, chest pain, difficulty breathing, difficulty swallowing, wheezing, stridor, or drooling, fever that does not respond to ibuprofen and Tylenol, inability to tolerate fluids, dehydration, especially in the setting of fever, chills, nausea or vomiting, lethargy.     Patient states they have good understanding and they are agreeable with the plan of care.     - doxycycline (VIBRAMYCIN) 100 MG Tab; Take 1 Tablet by mouth 2 times a day for 14 days.  Dispense: 28 Tablet; Refill: 0    2. Vaginal itching  Discussed with patient that there are other vaginal pathogens that can cause symptoms that mimic UTI, suggest she do a vaginal pathogen self swab, results usually come in within 12 to 24 hours, I will notify her of the results, treat if indicated.  She states she understands and is agreeable.    - VAGINAL PATHOGENS DNA PANEL; Future  - POCT Urinalysis  - URINE CULTURE(NEW); Future  - Chlamydia/GC, PCR (Genital/Anal swab); Future  - metroNIDAZOLE (FLAGYL) 500 MG Tab; Take 1 Tablet by mouth 2 times a day for 14 days.  Dispense: 28 Tablet; Refill: 0    3. Pelvic pain  - VAGINAL PATHOGENS DNA PANEL; Future  - POCT Urinalysis  - URINE CULTURE(NEW); Future  - Chlamydia/GC, PCR (Genital/Anal swab); Future  - doxycycline (VIBRAMYCIN) 100 MG Tab; Take 1 Tablet by mouth 2 times a day for 14 days.  Dispense:  28 Tablet; Refill: 0  - metroNIDAZOLE (FLAGYL) 500 MG Tab; Take 1 Tablet by mouth 2 times a day for 14 days.  Dispense: 28 Tablet; Refill: 0    4. Vaginal discharge  - VAGINAL PATHOGENS DNA PANEL; Future  - POCT Urinalysis  - URINE CULTURE(NEW); Future  - Chlamydia/GC, PCR (Genital/Anal swab); Future  - metroNIDAZOLE (FLAGYL) 500 MG Tab; Take 1 Tablet by mouth 2 times a day for 14 days.  Dispense: 28 Tablet; Refill: 0    5. Unilateral inguinal hernia without obstruction or gangrene, recurrence not specified  I discussed with patient that I have made a referral to general surgery for evaluation of hernia, I gave her a printed copy of the referral with the phone number to call to make an appointment.  She states she understands  - Referral to General Surgery    6. PID (acute pelvic inflammatory disease)  Given patient's symptoms, presentation, the fact that she is homeless and has been engaging in risky sexual activity I will go ahead and treat her today for pelvic inflammatory disease with doxycycline, Flagyl, and a Rocephin shot in clinic.  Orders placed for STI panel, patient instructed [facility], given written information as to locations and operating times  - doxycycline (VIBRAMYCIN) 100 MG Tab; Take 1 Tablet by mouth 2 times a day for 14 days.  Dispense: 28 Tablet; Refill: 0  - metroNIDAZOLE (FLAGYL) 500 MG Tab; Take 1 Tablet by mouth 2 times a day for 14 days.  Dispense: 28 Tablet; Refill: 0  - Chlamydia/GC, PCR (Genital/Anal swab); Future  - HIV AG/AB Combo Assay Screening; Future  - T.Pallidum AB ROSIE (Screening); Future  - Trichomonas Vaginalis by TMA  - Hepatitis C Virus Antibody; Future  - HEP B Surface Antibody; Future  - Hep B Core AB Total; Future  - Hep B Surface Antigen; Future  - Comp Metabolic Panel; Future    7. Possible exposure to sexually transmitted infection  - metroNIDAZOLE (FLAGYL) 500 MG Tab; Take 1 Tablet by mouth 2 times a day for 14 days.  Dispense: 28  Tablet; Refill: 0  - Chlamydia/GC, PCR (Genital/Anal swab); Future  - HIV AG/AB Combo Assay Screening; Future  - T.Pallidum AB ROSIE (Screening); Future  - Trichomonas Vaginalis by TMA  - Hepatitis C Virus Antibody; Future  - HEP B Surface Antibody; Future  - Hep B Core AB Total; Future  - Hep B Surface Antigen; Future  - Comp Metabolic Panel; Future    Note-after patient had left, LUCIA Hernandez did inform me that patient had refused a Rocephin shot in clinic, and also was not able/unwilling to give a urine sample for UA/preg/culture so these were not able to be done.    Time spent evaluating the patient was 35 minutes which included preparing for the visit, obtaining history, examination, ordering labs/tests/procedures/medications, independent interpretation, discussion of plan, counseling/education, and documentation into chart.          Pt is clinically stable at today's acute urgent care visit. Vital signs are normal and reassuring.  No acute distress noted. Appropriate for outpatient management at this time.        I personally reviewed prior external notes and test results pertinent to today's visit.  I have independently reviewed and interpreted all diagnostics ordered during this urgent care acute visit.        Please note that this dictation was created using voice recognition software. I have made a reasonable attempt to correct obvious errors, but I expect that there are errors of grammar and possibly content that I did not discover before finalizing the note.    This note was electronically signed by Mark TRUJILLO, ELYSSA, PRAFUL, PEDRO

## 2024-04-12 LAB
C TRACH DNA GENITAL QL NAA+PROBE: NEGATIVE
CANDIDA DNA VAG QL PROBE+SIG AMP: NEGATIVE
G VAGINALIS DNA VAG QL PROBE+SIG AMP: POSITIVE
N GONORRHOEA DNA GENITAL QL NAA+PROBE: NEGATIVE
SPECIMEN SOURCE: NORMAL
T VAGINALIS DNA VAG QL PROBE+SIG AMP: POSITIVE

## 2024-04-13 ENCOUNTER — TELEPHONE (OUTPATIENT)
Dept: URGENT CARE | Facility: CLINIC | Age: 49
End: 2024-04-13
Payer: COMMERCIAL

## 2024-04-13 NOTE — TELEPHONE ENCOUNTER
I did attempt to call patient to let her know of her test results and that she is on the correct antibiotics.  It appears the phone number she provided [facility]

## 2024-04-16 ASSESSMENT — ENCOUNTER SYMPTOMS
DOUBLE VISION: 0
PALPITATIONS: 0
HEMOPTYSIS: 0
SPUTUM PRODUCTION: 0
EYE DISCHARGE: 0
CHILLS: 1
HEADACHES: 1
WHEEZING: 0
SHORTNESS OF BREATH: 0
NERVOUS/ANXIOUS: 0
EYE REDNESS: 0
DIZZINESS: 0
MYALGIAS: 0
ABDOMINAL PAIN: 1
FLANK PAIN: 0
SINUS PAIN: 1
DIARRHEA: 0
DEPRESSION: 0
HEARTBURN: 0
VOMITING: 0
CONSTIPATION: 0
BLURRED VISION: 0
NAUSEA: 0
PHOTOPHOBIA: 0
EYE PAIN: 0
COUGH: 0
BLOOD IN STOOL: 0
SORE THROAT: 0

## 2024-04-16 ASSESSMENT — LIFESTYLE VARIABLES: SUBSTANCE_ABUSE: 0

## 2024-06-20 ENCOUNTER — HOSPITAL ENCOUNTER (OUTPATIENT)
Facility: MEDICAL CENTER | Age: 49
End: 2024-06-20
Attending: PHYSICIAN ASSISTANT
Payer: COMMERCIAL

## 2024-06-20 ENCOUNTER — OFFICE VISIT (OUTPATIENT)
Dept: URGENT CARE | Facility: CLINIC | Age: 49
End: 2024-06-20
Payer: COMMERCIAL

## 2024-06-20 VITALS
HEIGHT: 64 IN | BODY MASS INDEX: 20.69 KG/M2 | TEMPERATURE: 98.8 F | HEART RATE: 72 BPM | SYSTOLIC BLOOD PRESSURE: 120 MMHG | DIASTOLIC BLOOD PRESSURE: 60 MMHG | WEIGHT: 121.2 LBS | OXYGEN SATURATION: 98 % | RESPIRATION RATE: 18 BRPM

## 2024-06-20 DIAGNOSIS — N30.00 ACUTE CYSTITIS WITHOUT HEMATURIA: ICD-10-CM

## 2024-06-20 DIAGNOSIS — R10.2 SUPRAPUBIC DISCOMFORT: ICD-10-CM

## 2024-06-20 DIAGNOSIS — Z71.1 CONCERN ABOUT SEXUALLY TRANSMITTED DISEASE IN FEMALE WITHOUT DIAGNOSIS: ICD-10-CM

## 2024-06-20 PROCEDURE — 99213 OFFICE O/P EST LOW 20 MIN: CPT | Performed by: PHYSICIAN ASSISTANT

## 2024-06-20 PROCEDURE — 87591 N.GONORRHOEAE DNA AMP PROB: CPT

## 2024-06-20 PROCEDURE — 3074F SYST BP LT 130 MM HG: CPT | Performed by: PHYSICIAN ASSISTANT

## 2024-06-20 PROCEDURE — 3078F DIAST BP <80 MM HG: CPT | Performed by: PHYSICIAN ASSISTANT

## 2024-06-20 PROCEDURE — 87491 CHLMYD TRACH DNA AMP PROBE: CPT

## 2024-06-20 RX ORDER — IBUPROFEN 800 MG/1
800 TABLET ORAL EVERY 8 HOURS PRN
Qty: 60 TABLET | Refills: 0 | Status: SHIPPED | OUTPATIENT
Start: 2024-06-20

## 2024-06-20 RX ORDER — CEFDINIR 300 MG/1
300 CAPSULE ORAL 2 TIMES DAILY
Qty: 14 CAPSULE | Refills: 0 | Status: SHIPPED | OUTPATIENT
Start: 2024-06-20 | End: 2024-06-27

## 2024-06-20 ASSESSMENT — ENCOUNTER SYMPTOMS
WHEEZING: 0
ABDOMINAL PAIN: 1
FLANK PAIN: 0
CHILLS: 0
VOMITING: 0
DIARRHEA: 0
NAUSEA: 0
COUGH: 0
SHORTNESS OF BREATH: 0
FEVER: 0
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
## 2024-06-21 LAB
C TRACH DNA GENITAL QL NAA+PROBE: NEGATIVE
N GONORRHOEA DNA GENITAL QL NAA+PROBE: NEGATIVE
SPECIMEN SOURCE: NORMAL

## 2024-06-25 ENCOUNTER — APPOINTMENT (OUTPATIENT)
Dept: RADIOLOGY | Facility: IMAGING CENTER | Age: 49
End: 2024-06-25
Attending: PHYSICIAN ASSISTANT
Payer: COMMERCIAL

## 2024-06-25 ENCOUNTER — OFFICE VISIT (OUTPATIENT)
Dept: URGENT CARE | Facility: CLINIC | Age: 49
End: 2024-06-25
Payer: COMMERCIAL

## 2024-06-25 VITALS
BODY MASS INDEX: 20.66 KG/M2 | SYSTOLIC BLOOD PRESSURE: 134 MMHG | HEIGHT: 64 IN | WEIGHT: 121 LBS | HEART RATE: 104 BPM | OXYGEN SATURATION: 98 % | TEMPERATURE: 98.1 F | DIASTOLIC BLOOD PRESSURE: 74 MMHG | RESPIRATION RATE: 20 BRPM

## 2024-06-25 DIAGNOSIS — R07.89 CHEST WALL DISCOMFORT: ICD-10-CM

## 2024-06-25 DIAGNOSIS — R39.9 UTI SYMPTOMS: ICD-10-CM

## 2024-06-25 PROCEDURE — 3078F DIAST BP <80 MM HG: CPT | Performed by: PHYSICIAN ASSISTANT

## 2024-06-25 PROCEDURE — 99213 OFFICE O/P EST LOW 20 MIN: CPT | Performed by: PHYSICIAN ASSISTANT

## 2024-06-25 PROCEDURE — 3075F SYST BP GE 130 - 139MM HG: CPT | Performed by: PHYSICIAN ASSISTANT

## 2024-06-25 RX ORDER — ACETAMINOPHEN 500 MG
500 TABLET ORAL ONCE
Status: COMPLETED | OUTPATIENT
Start: 2024-06-25 | End: 2024-06-25

## 2024-06-25 RX ORDER — SULFAMETHOXAZOLE AND TRIMETHOPRIM 800; 160 MG/1; MG/1
1 TABLET ORAL 2 TIMES DAILY
Qty: 6 TABLET | Refills: 0 | Status: SHIPPED | OUTPATIENT
Start: 2024-06-25 | End: 2024-06-28

## 2024-06-25 RX ADMIN — Medication 500 MG: at 11:10

## 2024-06-25 ASSESSMENT — ENCOUNTER SYMPTOMS
SPUTUM PRODUCTION: 0
FEVER: 0
HEMOPTYSIS: 0
COUGH: 0
CHILLS: 0
ABDOMINAL PAIN: 0
WHEEZING: 0
PALPITATIONS: 0
SHORTNESS OF BREATH: 0
NAUSEA: 0
VOMITING: 0
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
## 2024-07-07 ENCOUNTER — HOSPITAL ENCOUNTER (EMERGENCY)
Facility: MEDICAL CENTER | Age: 49
End: 2024-07-07
Attending: EMERGENCY MEDICINE
Payer: COMMERCIAL

## 2024-07-07 ENCOUNTER — PHARMACY VISIT (OUTPATIENT)
Dept: PHARMACY | Facility: MEDICAL CENTER | Age: 49
End: 2024-07-07
Payer: COMMERCIAL

## 2024-07-07 VITALS
HEART RATE: 72 BPM | DIASTOLIC BLOOD PRESSURE: 80 MMHG | OXYGEN SATURATION: 97 % | TEMPERATURE: 98.5 F | RESPIRATION RATE: 18 BRPM | SYSTOLIC BLOOD PRESSURE: 121 MMHG | BODY MASS INDEX: 20.63 KG/M2 | HEIGHT: 64 IN | WEIGHT: 120.81 LBS

## 2024-07-07 DIAGNOSIS — R10.9 FLANK PAIN: ICD-10-CM

## 2024-07-07 DIAGNOSIS — N39.0 ACUTE UTI: ICD-10-CM

## 2024-07-07 DIAGNOSIS — M79.601 RIGHT ARM PAIN: ICD-10-CM

## 2024-07-07 LAB
ALBUMIN SERPL BCP-MCNC: 3.9 G/DL (ref 3.2–4.9)
ALBUMIN/GLOB SERPL: 1.4 G/DL
ALP SERPL-CCNC: 85 U/L (ref 30–99)
ALT SERPL-CCNC: 10 U/L (ref 2–50)
ANION GAP SERPL CALC-SCNC: 11 MMOL/L (ref 7–16)
APPEARANCE UR: ABNORMAL
AST SERPL-CCNC: 12 U/L (ref 12–45)
BACTERIA #/AREA URNS HPF: NEGATIVE /HPF
BASOPHILS # BLD AUTO: 0.3 % (ref 0–1.8)
BASOPHILS # BLD: 0.02 K/UL (ref 0–0.12)
BILIRUB SERPL-MCNC: <0.2 MG/DL (ref 0.1–1.5)
BILIRUB UR QL STRIP.AUTO: NEGATIVE
BUN SERPL-MCNC: 20 MG/DL (ref 8–22)
CALCIUM ALBUM COR SERPL-MCNC: 9.5 MG/DL (ref 8.5–10.5)
CALCIUM SERPL-MCNC: 9.4 MG/DL (ref 8.5–10.5)
CAOX CRY #/AREA URNS HPF: ABNORMAL /HPF
CHLORIDE SERPL-SCNC: 109 MMOL/L (ref 96–112)
CO2 SERPL-SCNC: 22 MMOL/L (ref 20–33)
COLOR UR: YELLOW
CREAT SERPL-MCNC: 0.54 MG/DL (ref 0.5–1.4)
EOSINOPHIL # BLD AUTO: 0.17 K/UL (ref 0–0.51)
EOSINOPHIL NFR BLD: 2.5 % (ref 0–6.9)
EPI CELLS #/AREA URNS HPF: ABNORMAL /HPF
ERYTHROCYTE [DISTWIDTH] IN BLOOD BY AUTOMATED COUNT: 47.2 FL (ref 35.9–50)
GFR SERPLBLD CREATININE-BSD FMLA CKD-EPI: 113 ML/MIN/1.73 M 2
GLOBULIN SER CALC-MCNC: 2.7 G/DL (ref 1.9–3.5)
GLUCOSE SERPL-MCNC: 100 MG/DL (ref 65–99)
GLUCOSE UR STRIP.AUTO-MCNC: NEGATIVE MG/DL
HCG SERPL QL: NEGATIVE
HCT VFR BLD AUTO: 39.3 % (ref 37–47)
HGB BLD-MCNC: 13.1 G/DL (ref 12–16)
HIV 1+2 AB+HIV1 P24 AG SERPL QL IA: NORMAL
HYALINE CASTS #/AREA URNS LPF: ABNORMAL /LPF
IMM GRANULOCYTES # BLD AUTO: 0.01 K/UL (ref 0–0.11)
IMM GRANULOCYTES NFR BLD AUTO: 0.1 % (ref 0–0.9)
KETONES UR STRIP.AUTO-MCNC: ABNORMAL MG/DL
LEUKOCYTE ESTERASE UR QL STRIP.AUTO: ABNORMAL
LIPASE SERPL-CCNC: 42 U/L (ref 11–82)
LYMPHOCYTES # BLD AUTO: 2.46 K/UL (ref 1–4.8)
LYMPHOCYTES NFR BLD: 36.9 % (ref 22–41)
MCH RBC QN AUTO: 32.5 PG (ref 27–33)
MCHC RBC AUTO-ENTMCNC: 33.3 G/DL (ref 32.2–35.5)
MCV RBC AUTO: 97.5 FL (ref 81.4–97.8)
MICRO URNS: ABNORMAL
MONOCYTES # BLD AUTO: 0.38 K/UL (ref 0–0.85)
MONOCYTES NFR BLD AUTO: 5.7 % (ref 0–13.4)
NEUTROPHILS # BLD AUTO: 3.63 K/UL (ref 1.82–7.42)
NEUTROPHILS NFR BLD: 54.5 % (ref 44–72)
NITRITE UR QL STRIP.AUTO: NEGATIVE
NRBC # BLD AUTO: 0 K/UL
NRBC BLD-RTO: 0 /100 WBC (ref 0–0.2)
PH UR STRIP.AUTO: 5 [PH] (ref 5–8)
PLATELET # BLD AUTO: 371 K/UL (ref 164–446)
PMV BLD AUTO: 8.8 FL (ref 9–12.9)
POTASSIUM SERPL-SCNC: 3.9 MMOL/L (ref 3.6–5.5)
PROT SERPL-MCNC: 6.6 G/DL (ref 6–8.2)
PROT UR QL STRIP: NEGATIVE MG/DL
RBC # BLD AUTO: 4.03 M/UL (ref 4.2–5.4)
RBC # URNS HPF: ABNORMAL /HPF
RBC UR QL AUTO: NEGATIVE
SODIUM SERPL-SCNC: 142 MMOL/L (ref 135–145)
SP GR UR STRIP.AUTO: 1.03
T PALLIDUM AB SER QL IA: NORMAL
UROBILINOGEN UR STRIP.AUTO-MCNC: 1 MG/DL
WBC # BLD AUTO: 6.7 K/UL (ref 4.8–10.8)
WBC #/AREA URNS HPF: ABNORMAL /HPF

## 2024-07-07 PROCEDURE — 87591 N.GONORRHOEAE DNA AMP PROB: CPT

## 2024-07-07 PROCEDURE — 87086 URINE CULTURE/COLONY COUNT: CPT

## 2024-07-07 PROCEDURE — 36415 COLL VENOUS BLD VENIPUNCTURE: CPT

## 2024-07-07 PROCEDURE — RXMED WILLOW AMBULATORY MEDICATION CHARGE: Performed by: EMERGENCY MEDICINE

## 2024-07-07 PROCEDURE — 83690 ASSAY OF LIPASE: CPT

## 2024-07-07 PROCEDURE — 80053 COMPREHEN METABOLIC PANEL: CPT

## 2024-07-07 PROCEDURE — 87491 CHLMYD TRACH DNA AMP PROBE: CPT

## 2024-07-07 PROCEDURE — 84703 CHORIONIC GONADOTROPIN ASSAY: CPT

## 2024-07-07 PROCEDURE — 86780 TREPONEMA PALLIDUM: CPT

## 2024-07-07 PROCEDURE — 99284 EMERGENCY DEPT VISIT MOD MDM: CPT

## 2024-07-07 PROCEDURE — 87389 HIV-1 AG W/HIV-1&-2 AB AG IA: CPT

## 2024-07-07 PROCEDURE — 85025 COMPLETE CBC W/AUTO DIFF WBC: CPT

## 2024-07-07 PROCEDURE — 81001 URINALYSIS AUTO W/SCOPE: CPT

## 2024-07-07 RX ORDER — PHENAZOPYRIDINE HYDROCHLORIDE 200 MG/1
100 TABLET, FILM COATED ORAL 3 TIMES DAILY PRN
Qty: 6 TABLET | Refills: 0 | Status: SHIPPED | OUTPATIENT
Start: 2024-07-07 | End: 2024-07-12

## 2024-07-07 RX ORDER — SULFAMETHOXAZOLE AND TRIMETHOPRIM 800; 160 MG/1; MG/1
1 TABLET ORAL 2 TIMES DAILY
Qty: 14 TABLET | Refills: 0 | Status: ACTIVE | OUTPATIENT
Start: 2024-07-07 | End: 2024-07-14

## 2024-07-07 ASSESSMENT — PAIN DESCRIPTION - PAIN TYPE: TYPE: ACUTE PAIN

## 2024-07-08 LAB
C TRACH DNA SPEC QL NAA+PROBE: NEGATIVE
N GONORRHOEA DNA SPEC QL NAA+PROBE: NEGATIVE
SPECIMEN SOURCE: NORMAL

## 2024-07-09 LAB
BACTERIA UR CULT: NORMAL
SIGNIFICANT IND 70042: NORMAL
SITE SITE: NORMAL
SOURCE SOURCE: NORMAL

## 2024-07-12 ENCOUNTER — PHARMACY VISIT (OUTPATIENT)
Dept: PHARMACY | Facility: MEDICAL CENTER | Age: 49
End: 2024-07-12
Payer: COMMERCIAL

## 2024-07-12 ENCOUNTER — OFFICE VISIT (OUTPATIENT)
Dept: URGENT CARE | Facility: CLINIC | Age: 49
End: 2024-07-12
Payer: COMMERCIAL

## 2024-07-12 ENCOUNTER — APPOINTMENT (OUTPATIENT)
Dept: URGENT CARE | Facility: CLINIC | Age: 49
End: 2024-07-12
Payer: COMMERCIAL

## 2024-07-12 ENCOUNTER — HOSPITAL ENCOUNTER (OUTPATIENT)
Facility: MEDICAL CENTER | Age: 49
End: 2024-07-12
Attending: PHYSICIAN ASSISTANT
Payer: COMMERCIAL

## 2024-07-12 VITALS
SYSTOLIC BLOOD PRESSURE: 138 MMHG | HEIGHT: 64 IN | HEART RATE: 82 BPM | WEIGHT: 120 LBS | DIASTOLIC BLOOD PRESSURE: 90 MMHG | BODY MASS INDEX: 20.49 KG/M2 | TEMPERATURE: 98.7 F | RESPIRATION RATE: 16 BRPM | OXYGEN SATURATION: 97 %

## 2024-07-12 DIAGNOSIS — N30.01 ACUTE CYSTITIS WITH HEMATURIA: Primary | ICD-10-CM

## 2024-07-12 DIAGNOSIS — N30.01 ACUTE CYSTITIS WITH HEMATURIA: ICD-10-CM

## 2024-07-12 DIAGNOSIS — R10.9 FLANK PAIN: ICD-10-CM

## 2024-07-12 DIAGNOSIS — N39.0 ACUTE UTI: ICD-10-CM

## 2024-07-12 DIAGNOSIS — K40.90 UNILATERAL INGUINAL HERNIA WITHOUT OBSTRUCTION OR GANGRENE, RECURRENCE NOT SPECIFIED: ICD-10-CM

## 2024-07-12 DIAGNOSIS — R30.0 DYSURIA: ICD-10-CM

## 2024-07-12 LAB

## 2024-07-12 PROCEDURE — 87086 URINE CULTURE/COLONY COUNT: CPT

## 2024-07-12 PROCEDURE — RXMED WILLOW AMBULATORY MEDICATION CHARGE: Performed by: PHYSICIAN ASSISTANT

## 2024-07-12 PROCEDURE — 81025 URINE PREGNANCY TEST: CPT | Performed by: PHYSICIAN ASSISTANT

## 2024-07-12 PROCEDURE — 81002 URINALYSIS NONAUTO W/O SCOPE: CPT | Performed by: PHYSICIAN ASSISTANT

## 2024-07-12 PROCEDURE — 3075F SYST BP GE 130 - 139MM HG: CPT | Performed by: PHYSICIAN ASSISTANT

## 2024-07-12 PROCEDURE — 99214 OFFICE O/P EST MOD 30 MIN: CPT | Performed by: PHYSICIAN ASSISTANT

## 2024-07-12 PROCEDURE — 3080F DIAST BP >= 90 MM HG: CPT | Performed by: PHYSICIAN ASSISTANT

## 2024-07-12 RX ORDER — IBUPROFEN 400 MG/1
400 TABLET ORAL EVERY 8 HOURS PRN
Qty: 21 TABLET | Refills: 0 | Status: SHIPPED | OUTPATIENT
Start: 2024-07-12

## 2024-07-12 RX ORDER — PHENAZOPYRIDINE HYDROCHLORIDE 200 MG/1
100 TABLET, FILM COATED ORAL 3 TIMES DAILY PRN
Qty: 6 TABLET | Refills: 0 | Status: CANCELLED | OUTPATIENT
Start: 2024-07-12

## 2024-07-12 RX ORDER — PHENAZOPYRIDINE HYDROCHLORIDE 200 MG/1
200 TABLET, FILM COATED ORAL 3 TIMES DAILY PRN
Qty: 6 TABLET | Refills: 0 | Status: SHIPPED | OUTPATIENT
Start: 2024-07-12

## 2024-07-12 RX ORDER — NITROFURANTOIN 25; 75 MG/1; MG/1
100 CAPSULE ORAL 2 TIMES DAILY
Qty: 10 CAPSULE | Refills: 0 | Status: SHIPPED | OUTPATIENT
Start: 2024-07-12 | End: 2024-07-17

## 2024-07-12 RX ORDER — SULFAMETHOXAZOLE AND TRIMETHOPRIM 800; 160 MG/1; MG/1
1 TABLET ORAL 2 TIMES DAILY
Qty: 14 TABLET | Refills: 0 | Status: CANCELLED | OUTPATIENT
Start: 2024-07-12 | End: 2024-07-19

## 2024-07-12 ASSESSMENT — FIBROSIS 4 INDEX: FIB4 SCORE: 0.5

## 2024-07-14 LAB
BACTERIA UR CULT: NORMAL
SIGNIFICANT IND 70042: NORMAL
SITE SITE: NORMAL
SOURCE SOURCE: NORMAL

## 2024-08-20 ENCOUNTER — APPOINTMENT (OUTPATIENT)
Dept: URGENT CARE | Facility: CLINIC | Age: 49
End: 2024-08-20
Payer: COMMERCIAL

## 2024-09-17 ENCOUNTER — OFFICE VISIT (OUTPATIENT)
Dept: URGENT CARE | Facility: CLINIC | Age: 49
End: 2024-09-17
Payer: COMMERCIAL

## 2024-09-17 ENCOUNTER — PHARMACY VISIT (OUTPATIENT)
Dept: PHARMACY | Facility: MEDICAL CENTER | Age: 49
End: 2024-09-17
Payer: COMMERCIAL

## 2024-09-17 VITALS
HEART RATE: 75 BPM | WEIGHT: 125.4 LBS | OXYGEN SATURATION: 98 % | HEIGHT: 64 IN | DIASTOLIC BLOOD PRESSURE: 84 MMHG | SYSTOLIC BLOOD PRESSURE: 130 MMHG | TEMPERATURE: 98.5 F | RESPIRATION RATE: 18 BRPM | BODY MASS INDEX: 21.41 KG/M2

## 2024-09-17 DIAGNOSIS — K04.7 DENTAL INFECTION: ICD-10-CM

## 2024-09-17 DIAGNOSIS — K08.89 PAIN, DENTAL: ICD-10-CM

## 2024-09-17 PROCEDURE — RXMED WILLOW AMBULATORY MEDICATION CHARGE: Performed by: STUDENT IN AN ORGANIZED HEALTH CARE EDUCATION/TRAINING PROGRAM

## 2024-09-17 PROCEDURE — 99213 OFFICE O/P EST LOW 20 MIN: CPT | Performed by: STUDENT IN AN ORGANIZED HEALTH CARE EDUCATION/TRAINING PROGRAM

## 2024-09-17 PROCEDURE — 3079F DIAST BP 80-89 MM HG: CPT | Performed by: STUDENT IN AN ORGANIZED HEALTH CARE EDUCATION/TRAINING PROGRAM

## 2024-09-17 PROCEDURE — 3075F SYST BP GE 130 - 139MM HG: CPT | Performed by: STUDENT IN AN ORGANIZED HEALTH CARE EDUCATION/TRAINING PROGRAM

## 2024-09-17 RX ORDER — IBUPROFEN 800 MG/1
800 TABLET, FILM COATED ORAL EVERY 8 HOURS PRN
Qty: 30 TABLET | Refills: 0 | Status: SHIPPED | OUTPATIENT
Start: 2024-09-17

## 2024-09-17 ASSESSMENT — FIBROSIS 4 INDEX: FIB4 SCORE: 0.5

## 2024-09-17 NOTE — PROGRESS NOTES
"Subjective:   Ivette Jett is a 49 y.o. female who presents for Dental Injury (X2 weeks Teeth pain, may have infection, headache, fatigue, and tooth broke in the front. )      HPI:  49-year-old female presents to the urgent care for 2 weeks of intermittent dental pain that has been worsening over the past few days.  Reports numerous broken and decayed teeth.  States that she has been trying to get into her dentist but they told her that I will take several months to do this.  She has had dental infections in the past.  She is still able to tolerate liquids and solids.  No fever, chills, nausea, vomiting.    Medications:    amoxicillin-clavulanate Tabs  benzonatate Caps  ibuprofen Tabs  phenazopyridine Tabs    Allergies: Patient has no known allergies.    Problem List: Ivette Jett does not have a problem list on file.    Surgical History:  No past surgical history on file.    Past Social Hx: Ivette Jett  reports that she has been smoking cigarettes. She has never used smokeless tobacco. She reports current alcohol use. She reports that she does not currently use drugs after having used the following drugs: Inhaled and Methamphetamines.     Past Family Hx:  Ivette Jett family history is not on file.     Problem list, medications, and allergies reviewed by myself today in Epic.     Objective:     /84   Pulse 75   Temp 36.9 °C (98.5 °F) (Temporal)   Resp 18   Ht 1.626 m (5' 4\")   Wt 56.9 kg (125 lb 6.4 oz)   SpO2 98%   BMI 21.52 kg/m²     Physical Exam  Vitals reviewed.   HENT:      Mouth/Throat:      Mouth: Mucous membranes are moist.      Dentition: Abnormal dentition. Dental tenderness, gingival swelling and dental caries present. No dental abscesses.      Comments: Numerous decayed and broken teeth throughout the mouth.  Gingival swelling and discomfort to the right upper jawline without signs of dental abscess.  Cardiovascular:      Rate and Rhythm: " Normal rate.      Pulses: Normal pulses.   Pulmonary:      Effort: Pulmonary effort is normal.         Assessment/Plan:     Diagnosis and associated orders:     1. Pain, dental  ibuprofen (MOTRIN) 800 MG Tab      2. Dental infection  amoxicillin-clavulanate (AUGMENTIN) 875-125 MG Tab         Comments/MDM:     Physical exam findings consistent with dental pain due to chronic dental caries and broken teeth.  Do suspect dental infection at this time.  Augmentin for treatment.  Ibuprofen for pain.  She has contacted dental offices for an appointment but cannot get in for several months.  Vitals are stable.  Discussed signs of dental abscess that warrant evaluation emergency department.  No abscess at this time.         Differential diagnosis, natural history, supportive care, and indications for immediate follow-up discussed.    Advised the patient to follow-up with the primary care physician for recheck, reevaluation, and consideration of further management.    Please note that this dictation was created using voice recognition software. I have made a reasonable attempt to correct obvious errors, but I expect that there are errors of grammar and possibly content that I did not discover before finalizing the note.    Electronically signed by Mark Mendoza PA-C.

## 2024-09-19 ENCOUNTER — NON-PROVIDER VISIT (OUTPATIENT)
Dept: OBGYN | Facility: CLINIC | Age: 49
End: 2024-09-19
Payer: COMMERCIAL

## 2024-09-19 DIAGNOSIS — N92.6 MISSED PERIOD: Primary | ICD-10-CM

## 2024-09-19 LAB
POCT INT CON NEG: NEGATIVE
POCT INT CON POS: POSITIVE
POCT URINE PREGNANCY TEST: NEGATIVE

## 2024-09-19 PROCEDURE — 81025 URINE PREGNANCY TEST: CPT

## 2024-09-19 NOTE — PROGRESS NOTES
Pt came in for a pregnancy test. She has been experiencing irregular periods.  Pregnancy test negative

## 2024-10-04 ENCOUNTER — HOSPITAL ENCOUNTER (OUTPATIENT)
Facility: MEDICAL CENTER | Age: 49
End: 2024-10-04
Attending: PHYSICIAN ASSISTANT
Payer: COMMERCIAL

## 2024-10-04 ENCOUNTER — PHARMACY VISIT (OUTPATIENT)
Dept: PHARMACY | Facility: MEDICAL CENTER | Age: 49
End: 2024-10-04
Payer: COMMERCIAL

## 2024-10-04 ENCOUNTER — OFFICE VISIT (OUTPATIENT)
Dept: URGENT CARE | Facility: CLINIC | Age: 49
End: 2024-10-04
Payer: COMMERCIAL

## 2024-10-04 VITALS
BODY MASS INDEX: 19.66 KG/M2 | HEART RATE: 86 BPM | RESPIRATION RATE: 16 BRPM | SYSTOLIC BLOOD PRESSURE: 110 MMHG | OXYGEN SATURATION: 98 % | TEMPERATURE: 97.5 F | DIASTOLIC BLOOD PRESSURE: 82 MMHG | WEIGHT: 118 LBS | HEIGHT: 65 IN

## 2024-10-04 DIAGNOSIS — N39.0 URINARY TRACT INFECTION WITHOUT HEMATURIA, SITE UNSPECIFIED: ICD-10-CM

## 2024-10-04 DIAGNOSIS — R30.0 DYSURIA: ICD-10-CM

## 2024-10-04 DIAGNOSIS — M54.50 ACUTE RIGHT-SIDED LOW BACK PAIN WITHOUT SCIATICA: ICD-10-CM

## 2024-10-04 DIAGNOSIS — K40.90 UNILATERAL INGUINAL HERNIA WITHOUT OBSTRUCTION OR GANGRENE, RECURRENCE NOT SPECIFIED: ICD-10-CM

## 2024-10-04 DIAGNOSIS — K04.7 DENTAL INFECTION: ICD-10-CM

## 2024-10-04 LAB
APPEARANCE UR: NORMAL
BILIRUB UR STRIP-MCNC: NORMAL MG/DL
COLOR UR AUTO: NORMAL
GLUCOSE UR STRIP.AUTO-MCNC: NORMAL MG/DL
KETONES UR STRIP.AUTO-MCNC: 15 MG/DL
LEUKOCYTE ESTERASE UR QL STRIP.AUTO: NORMAL
NITRITE UR QL STRIP.AUTO: NORMAL
PH UR STRIP.AUTO: 6 [PH] (ref 5–8)
PROT UR QL STRIP: NORMAL MG/DL
RBC UR QL AUTO: NORMAL
SP GR UR STRIP.AUTO: 1.02
UROBILINOGEN UR STRIP-MCNC: 0.2 MG/DL

## 2024-10-04 PROCEDURE — 81002 URINALYSIS NONAUTO W/O SCOPE: CPT | Performed by: PHYSICIAN ASSISTANT

## 2024-10-04 PROCEDURE — 87086 URINE CULTURE/COLONY COUNT: CPT

## 2024-10-04 PROCEDURE — RXMED WILLOW AMBULATORY MEDICATION CHARGE: Performed by: PHYSICIAN ASSISTANT

## 2024-10-04 PROCEDURE — 3079F DIAST BP 80-89 MM HG: CPT | Performed by: PHYSICIAN ASSISTANT

## 2024-10-04 PROCEDURE — 3074F SYST BP LT 130 MM HG: CPT | Performed by: PHYSICIAN ASSISTANT

## 2024-10-04 PROCEDURE — 99214 OFFICE O/P EST MOD 30 MIN: CPT | Performed by: PHYSICIAN ASSISTANT

## 2024-10-04 RX ORDER — IBUPROFEN 800 MG/1
800 TABLET, FILM COATED ORAL EVERY 8 HOURS PRN
Qty: 30 TABLET | Refills: 0 | Status: SHIPPED | OUTPATIENT
Start: 2024-10-04 | End: 2024-10-05 | Stop reason: SDUPTHER

## 2024-10-04 ASSESSMENT — FIBROSIS 4 INDEX: FIB4 SCORE: 0.5

## 2024-10-04 ASSESSMENT — ENCOUNTER SYMPTOMS
EYE DISCHARGE: 0
ABDOMINAL PAIN: 1
VOMITING: 0
FEVER: 0
EYE REDNESS: 0

## 2024-10-05 RX ORDER — IBUPROFEN 800 MG/1
800 TABLET, FILM COATED ORAL EVERY 8 HOURS PRN
Qty: 30 TABLET | Refills: 0 | Status: SHIPPED | OUTPATIENT
Start: 2024-10-05

## 2024-10-06 LAB
BACTERIA UR CULT: NORMAL
SIGNIFICANT IND 70042: NORMAL
SITE SITE: NORMAL
SOURCE SOURCE: NORMAL

## 2024-10-30 ENCOUNTER — PHARMACY VISIT (OUTPATIENT)
Dept: PHARMACY | Facility: MEDICAL CENTER | Age: 49
End: 2024-10-30
Payer: COMMERCIAL

## 2024-10-30 ENCOUNTER — HOSPITAL ENCOUNTER (OUTPATIENT)
Facility: MEDICAL CENTER | Age: 49
End: 2024-10-30
Payer: COMMERCIAL

## 2024-10-30 ENCOUNTER — OFFICE VISIT (OUTPATIENT)
Dept: URGENT CARE | Facility: CLINIC | Age: 49
End: 2024-10-30
Payer: COMMERCIAL

## 2024-10-30 VITALS
BODY MASS INDEX: 20.96 KG/M2 | RESPIRATION RATE: 20 BRPM | TEMPERATURE: 97.9 F | WEIGHT: 122.8 LBS | HEIGHT: 64 IN | OXYGEN SATURATION: 99 % | SYSTOLIC BLOOD PRESSURE: 126 MMHG | DIASTOLIC BLOOD PRESSURE: 74 MMHG | HEART RATE: 82 BPM

## 2024-10-30 DIAGNOSIS — N89.8 VAGINAL IRRITATION: ICD-10-CM

## 2024-10-30 DIAGNOSIS — R10.84 GENERALIZED ABDOMINAL PAIN: ICD-10-CM

## 2024-10-30 DIAGNOSIS — Z20.2 POSSIBLE EXPOSURE TO STI: ICD-10-CM

## 2024-10-30 DIAGNOSIS — M54.50 ACUTE BILATERAL LOW BACK PAIN WITHOUT SCIATICA: ICD-10-CM

## 2024-10-30 DIAGNOSIS — K04.7 CHRONIC DENTAL INFECTION: ICD-10-CM

## 2024-10-30 LAB
APPEARANCE UR: NORMAL
BILIRUB UR STRIP-MCNC: NEGATIVE MG/DL
COLOR UR AUTO: YELLOW
GLUCOSE UR STRIP.AUTO-MCNC: NEGATIVE MG/DL
KETONES UR STRIP.AUTO-MCNC: NEGATIVE MG/DL
LEUKOCYTE ESTERASE UR QL STRIP.AUTO: NORMAL
NITRITE UR QL STRIP.AUTO: NEGATIVE
PH UR STRIP.AUTO: 6 [PH] (ref 5–8)
POCT INT CON NEG: NEGATIVE
POCT INT CON POS: POSITIVE
POCT URINE PREGNANCY TEST: NEGATIVE
PROT UR QL STRIP: NEGATIVE MG/DL
RBC UR QL AUTO: NEGATIVE
SP GR UR STRIP.AUTO: 1.01
UROBILINOGEN UR STRIP-MCNC: NORMAL MG/DL

## 2024-10-30 PROCEDURE — RXMED WILLOW AMBULATORY MEDICATION CHARGE

## 2024-10-30 PROCEDURE — 87086 URINE CULTURE/COLONY COUNT: CPT

## 2024-10-30 PROCEDURE — RXMED WILLOW AMBULATORY MEDICATION CHARGE: Performed by: FAMILY MEDICINE

## 2024-10-30 RX ORDER — ACETAMINOPHEN 500 MG
1000 TABLET ORAL ONCE
Status: COMPLETED | OUTPATIENT
Start: 2024-10-30 | End: 2024-10-30

## 2024-10-30 RX ORDER — CHLORHEXIDINE GLUCONATE ORAL RINSE 1.2 MG/ML
15 SOLUTION DENTAL 2 TIMES DAILY
Qty: 473 ML | Refills: 0 | Status: SHIPPED | OUTPATIENT
Start: 2024-10-30 | End: 2024-11-15

## 2024-10-30 RX ADMIN — Medication 1000 MG: at 11:18

## 2024-10-30 ASSESSMENT — VISUAL ACUITY: OU: 1

## 2024-10-30 ASSESSMENT — FIBROSIS 4 INDEX: FIB4 SCORE: 0.5

## 2024-10-31 LAB
BACTERIA UR CULT: NORMAL
SIGNIFICANT IND 70042: NORMAL
SITE SITE: NORMAL
SOURCE SOURCE: NORMAL

## 2024-11-01 ENCOUNTER — TELEPHONE (OUTPATIENT)
Dept: URGENT CARE | Facility: CLINIC | Age: 49
End: 2024-11-01
Payer: COMMERCIAL

## 2024-11-01 LAB
BACTERIA UR CULT: NORMAL
SIGNIFICANT IND 70042: NORMAL
SITE SITE: NORMAL
SOURCE SOURCE: NORMAL

## 2024-12-11 ENCOUNTER — OFFICE VISIT (OUTPATIENT)
Dept: URGENT CARE | Facility: CLINIC | Age: 49
End: 2024-12-11
Payer: COMMERCIAL

## 2024-12-11 ENCOUNTER — PHARMACY VISIT (OUTPATIENT)
Dept: PHARMACY | Facility: MEDICAL CENTER | Age: 49
End: 2024-12-11
Payer: COMMERCIAL

## 2024-12-11 VITALS
TEMPERATURE: 98 F | BODY MASS INDEX: 20.83 KG/M2 | OXYGEN SATURATION: 98 % | HEART RATE: 84 BPM | WEIGHT: 122 LBS | RESPIRATION RATE: 14 BRPM | SYSTOLIC BLOOD PRESSURE: 156 MMHG | DIASTOLIC BLOOD PRESSURE: 100 MMHG | HEIGHT: 64 IN

## 2024-12-11 DIAGNOSIS — N30.00 ACUTE CYSTITIS WITHOUT HEMATURIA: ICD-10-CM

## 2024-12-11 DIAGNOSIS — K04.7 DENTAL INFECTION: ICD-10-CM

## 2024-12-11 DIAGNOSIS — R30.0 DYSURIA: ICD-10-CM

## 2024-12-11 DIAGNOSIS — R05.1 ACUTE COUGH: ICD-10-CM

## 2024-12-11 DIAGNOSIS — M54.50 RECURRENT LOW BACK PAIN: ICD-10-CM

## 2024-12-11 LAB
APPEARANCE UR: NORMAL
BILIRUB UR STRIP-MCNC: NEGATIVE MG/DL
COLOR UR AUTO: YELLOW
GLUCOSE UR STRIP.AUTO-MCNC: NEGATIVE MG/DL
KETONES UR STRIP.AUTO-MCNC: NEGATIVE MG/DL
LEUKOCYTE ESTERASE UR QL STRIP.AUTO: NORMAL
NITRITE UR QL STRIP.AUTO: NEGATIVE
PH UR STRIP.AUTO: 6 [PH] (ref 5–8)
PROT UR QL STRIP: NEGATIVE MG/DL
RBC UR QL AUTO: NEGATIVE
SP GR UR STRIP.AUTO: 1.01
UROBILINOGEN UR STRIP-MCNC: NORMAL MG/DL

## 2024-12-11 PROCEDURE — 3077F SYST BP >= 140 MM HG: CPT | Performed by: FAMILY MEDICINE

## 2024-12-11 PROCEDURE — 3080F DIAST BP >= 90 MM HG: CPT | Performed by: FAMILY MEDICINE

## 2024-12-11 PROCEDURE — RXMED WILLOW AMBULATORY MEDICATION CHARGE: Performed by: FAMILY MEDICINE

## 2024-12-11 PROCEDURE — 99214 OFFICE O/P EST MOD 30 MIN: CPT | Performed by: FAMILY MEDICINE

## 2024-12-11 PROCEDURE — 81002 URINALYSIS NONAUTO W/O SCOPE: CPT | Performed by: FAMILY MEDICINE

## 2024-12-11 RX ORDER — IBUPROFEN 800 MG/1
800 TABLET, FILM COATED ORAL EVERY 8 HOURS PRN
Qty: 20 TABLET | Refills: 0 | Status: SHIPPED | OUTPATIENT
Start: 2024-12-11 | End: 2024-12-18

## 2024-12-11 RX ORDER — DEXTROMETHORPHAN HYDROBROMIDE AND PROMETHAZINE HYDROCHLORIDE 15; 6.25 MG/5ML; MG/5ML
5 SYRUP ORAL 4 TIMES DAILY PRN
Qty: 120 ML | Refills: 0 | Status: SHIPPED | OUTPATIENT
Start: 2024-12-11

## 2024-12-11 ASSESSMENT — FIBROSIS 4 INDEX: FIB4 SCORE: 0.5

## 2024-12-11 ASSESSMENT — ENCOUNTER SYMPTOMS
VOMITING: 0
EYE DISCHARGE: 0
WEIGHT LOSS: 0
EYE REDNESS: 0
NAUSEA: 0
MYALGIAS: 0

## 2024-12-11 NOTE — PROGRESS NOTES
"Subjective     Ivette Jett is a 49 y.o. female who presents with Cough (Congestion, back pain x2days )            2 days productive cough without blood in sputum.  No fever.  No shortness of breath or wheezing.  No PMH asthma or pneumonia.  Has associated low back pain.  Low back pain is a recurrent problem.  No limb swelling.  No chest pain.  Feels both sinus and chest congestion. Chronic recurrent dental infections. Few days pain upper front teeth. Intermittent dysuria/last symptoms 1-2 weeks ago.  No flank pain.  No myelopathy. No other aggravating or alleviating factors.        Review of Systems   Constitutional:  Positive for malaise/fatigue. Negative for weight loss.   Eyes:  Negative for discharge and redness.   Gastrointestinal:  Negative for nausea and vomiting.   Musculoskeletal:  Negative for joint pain and myalgias.   Skin:  Negative for itching and rash.              Objective     BP (!) 156/100   Pulse 84   Temp 36.7 °C (98 °F) (Temporal)   Resp 14   Ht 1.626 m (5' 4\")   Wt 55.3 kg (122 lb)   SpO2 98%   BMI 20.94 kg/m²      Physical Exam  Constitutional:       General: She is not in acute distress.     Appearance: She is well-developed.   HENT:      Head: Normocephalic and atraumatic.      Right Ear: Tympanic membrane normal.      Left Ear: Tympanic membrane normal.      Nose: Congestion present.      Mouth/Throat:      Mouth: Mucous membranes are moist.      Pharynx: Posterior oropharyngeal erythema present.      Comments: Multiple caries.  Upper anterior gingiva swollen without pointing abscess.  No trismus.  Eyes:      Conjunctiva/sclera: Conjunctivae normal.   Cardiovascular:      Rate and Rhythm: Normal rate and regular rhythm.      Heart sounds: Normal heart sounds. No murmur heard.  Pulmonary:      Effort: Pulmonary effort is normal.      Breath sounds: Normal breath sounds. No wheezing.   Abdominal:      Palpations: Abdomen is soft.      Tenderness: There is no abdominal " tenderness. There is no right CVA tenderness or left CVA tenderness.   Musculoskeletal:      Comments: Tender and spasm left paralumbar musculature.  No midline bony tenderness or step-off.   Skin:     General: Skin is warm and dry.      Findings: No rash.   Neurological:      Mental Status: She is alert.      Gait: Gait normal.      Deep Tendon Reflexes: Reflexes normal.      Comments: Bilateral lower extremity strength and sensory intact.  Negative straight leg raise.                               Assessment & Plan      POCT UA reviewed    Urgent care visit 10/30/2024 reviewed  Urgent care visit 9/17/2024 reviewed    1. Dental infection  amoxicillin-clavulanate (AUGMENTIN) 875-125 MG Tab    Referral to establish with PCP      2. Acute cough  promethazine-dextromethorphan (PROMETHAZINE-DM) 6.25-15 MG/5ML syrup      3. Dysuria  POCT Urinalysis      4. Acute cystitis without hematuria  amoxicillin-clavulanate (AUGMENTIN) 875-125 MG Tab      5. Recurrent low back pain  ibuprofen (MOTRIN) 800 MG Tab    Referral to establish with PCP        Differential diagnosis, natural history, supportive care, and indications for immediate follow-up were discussed.     BP is noted to be elevated as well.  Will refer to primary care.

## 2025-01-27 ENCOUNTER — PHARMACY VISIT (OUTPATIENT)
Dept: PHARMACY | Facility: MEDICAL CENTER | Age: 50
End: 2025-01-27
Payer: COMMERCIAL

## 2025-01-27 ENCOUNTER — OFFICE VISIT (OUTPATIENT)
Dept: URGENT CARE | Facility: CLINIC | Age: 50
End: 2025-01-27
Payer: COMMERCIAL

## 2025-01-27 ENCOUNTER — HOSPITAL ENCOUNTER (EMERGENCY)
Facility: MEDICAL CENTER | Age: 50
End: 2025-01-27
Payer: COMMERCIAL

## 2025-01-27 ENCOUNTER — HOSPITAL ENCOUNTER (OUTPATIENT)
Facility: MEDICAL CENTER | Age: 50
End: 2025-01-27
Payer: COMMERCIAL

## 2025-01-27 VITALS
HEIGHT: 64 IN | WEIGHT: 131.2 LBS | SYSTOLIC BLOOD PRESSURE: 146 MMHG | DIASTOLIC BLOOD PRESSURE: 88 MMHG | TEMPERATURE: 98.5 F | OXYGEN SATURATION: 99 % | HEART RATE: 92 BPM | RESPIRATION RATE: 16 BRPM | BODY MASS INDEX: 22.4 KG/M2

## 2025-01-27 VITALS
RESPIRATION RATE: 16 BRPM | TEMPERATURE: 97.6 F | WEIGHT: 120 LBS | HEIGHT: 64 IN | HEART RATE: 81 BPM | BODY MASS INDEX: 20.49 KG/M2 | OXYGEN SATURATION: 96 % | SYSTOLIC BLOOD PRESSURE: 150 MMHG | DIASTOLIC BLOOD PRESSURE: 91 MMHG

## 2025-01-27 DIAGNOSIS — J06.9 UPPER RESPIRATORY TRACT INFECTION, UNSPECIFIED TYPE: ICD-10-CM

## 2025-01-27 DIAGNOSIS — R03.0 ELEVATED BLOOD PRESSURE READING IN OFFICE WITHOUT DIAGNOSIS OF HYPERTENSION: ICD-10-CM

## 2025-01-27 DIAGNOSIS — K04.7 CHRONIC DENTAL INFECTION: ICD-10-CM

## 2025-01-27 DIAGNOSIS — R39.9 UTI SYMPTOMS: ICD-10-CM

## 2025-01-27 DIAGNOSIS — K40.90 UNILATERAL INGUINAL HERNIA WITHOUT OBSTRUCTION OR GANGRENE, RECURRENCE NOT SPECIFIED: ICD-10-CM

## 2025-01-27 LAB
APPEARANCE UR: CLEAR
BILIRUB UR STRIP-MCNC: NEGATIVE MG/DL
COLOR UR AUTO: YELLOW
GLUCOSE UR STRIP.AUTO-MCNC: NEGATIVE MG/DL
KETONES UR STRIP.AUTO-MCNC: NEGATIVE MG/DL
LEUKOCYTE ESTERASE UR QL STRIP.AUTO: NEGATIVE
NITRITE UR QL STRIP.AUTO: NEGATIVE
PH UR STRIP.AUTO: 5.5 [PH] (ref 5–8)
PROT UR QL STRIP: NEGATIVE MG/DL
RBC UR QL AUTO: NORMAL
SP GR UR STRIP.AUTO: >=1.03
UROBILINOGEN UR STRIP-MCNC: 0.2 MG/DL

## 2025-01-27 PROCEDURE — 87480 CANDIDA DNA DIR PROBE: CPT

## 2025-01-27 PROCEDURE — 87660 TRICHOMONAS VAGIN DIR PROBE: CPT

## 2025-01-27 PROCEDURE — 87491 CHLMYD TRACH DNA AMP PROBE: CPT

## 2025-01-27 PROCEDURE — 302449 STATCHG TRIAGE ONLY (STATISTIC)

## 2025-01-27 PROCEDURE — 87510 GARDNER VAG DNA DIR PROBE: CPT

## 2025-01-27 PROCEDURE — 87591 N.GONORRHOEAE DNA AMP PROB: CPT

## 2025-01-27 PROCEDURE — 81002 URINALYSIS NONAUTO W/O SCOPE: CPT

## 2025-01-27 PROCEDURE — 99214 OFFICE O/P EST MOD 30 MIN: CPT

## 2025-01-27 PROCEDURE — RXMED WILLOW AMBULATORY MEDICATION CHARGE

## 2025-01-27 PROCEDURE — 3077F SYST BP >= 140 MM HG: CPT

## 2025-01-27 PROCEDURE — 3079F DIAST BP 80-89 MM HG: CPT

## 2025-01-27 RX ORDER — DEXTROMETHORPHAN HYDROBROMIDE AND PROMETHAZINE HYDROCHLORIDE 15; 6.25 MG/5ML; MG/5ML
5 SYRUP ORAL EVERY 4 HOURS PRN
Qty: 118 ML | Refills: 0 | Status: SHIPPED | OUTPATIENT
Start: 2025-01-27 | End: 2025-02-03

## 2025-01-27 ASSESSMENT — FIBROSIS 4 INDEX
FIB4 SCORE: 0.5
FIB4 SCORE: 0.5

## 2025-01-27 ASSESSMENT — ENCOUNTER SYMPTOMS: ABDOMINAL PAIN: 1

## 2025-01-27 NOTE — ED TRIAGE NOTES
Ivettedenisse Tovarkristine  49 y.o. female  Chief Complaint   Patient presents with    Abdominal Pain     Lower abdominal pain for 1 week with mild vaginal bleed, possible on her period. Tested positive for gonorrhea last week. States antibiotic are gone.     Flu Like Symptoms     Pt ambulatory to triage with steady gait for above complaint.     Pt is GCS 15, speaking in full sentences, follows commands and responds appropriately to questions. Resp are even and unlabored.   Abdominal pain and COVID protocol ordered. Pt placed in phlebotomy. Pt educated on triage process. Pt encouraged to alert staff for any changes.       Vitals:    01/27/25 1330   BP: (!) 150/91   Pulse: 81   Resp: 16   Temp: 36.4 °C (97.6 °F)   SpO2: 96%
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
## 2025-01-28 ASSESSMENT — ENCOUNTER SYMPTOMS
BACK PAIN: 1
SORE THROAT: 0
FEVER: 0
HEADACHES: 0
DIARRHEA: 0
COUGH: 0
CHILLS: 0
VOMITING: 0
SHORTNESS OF BREATH: 0
MYALGIAS: 0
NAUSEA: 0

## 2025-01-28 ASSESSMENT — CROHNS DISEASE ACTIVITY INDEX (CDAI): CDAI SCORE: 0

## 2025-01-30 RX ORDER — METRONIDAZOLE 500 MG/1
500 TABLET ORAL 2 TIMES DAILY
Qty: 14 TABLET | Refills: 0 | Status: SHIPPED | OUTPATIENT
Start: 2025-01-30

## 2025-03-06 ENCOUNTER — PHARMACY VISIT (OUTPATIENT)
Dept: PHARMACY | Facility: MEDICAL CENTER | Age: 50
End: 2025-03-06
Payer: COMMERCIAL

## 2025-03-06 ENCOUNTER — OFFICE VISIT (OUTPATIENT)
Dept: URGENT CARE | Facility: CLINIC | Age: 50
End: 2025-03-06
Payer: COMMERCIAL

## 2025-03-06 VITALS
SYSTOLIC BLOOD PRESSURE: 140 MMHG | HEART RATE: 87 BPM | WEIGHT: 125 LBS | DIASTOLIC BLOOD PRESSURE: 88 MMHG | TEMPERATURE: 100.5 F | OXYGEN SATURATION: 95 % | HEIGHT: 64 IN | BODY MASS INDEX: 21.34 KG/M2 | RESPIRATION RATE: 18 BRPM

## 2025-03-06 DIAGNOSIS — G89.29 CHRONIC MIDLINE LOW BACK PAIN WITHOUT SCIATICA: ICD-10-CM

## 2025-03-06 DIAGNOSIS — M54.50 CHRONIC MIDLINE LOW BACK PAIN WITHOUT SCIATICA: ICD-10-CM

## 2025-03-06 DIAGNOSIS — K04.7 CHRONIC DENTAL INFECTION: ICD-10-CM

## 2025-03-06 PROCEDURE — 99214 OFFICE O/P EST MOD 30 MIN: CPT | Performed by: PHYSICIAN ASSISTANT

## 2025-03-06 PROCEDURE — RXMED WILLOW AMBULATORY MEDICATION CHARGE: Performed by: PHYSICIAN ASSISTANT

## 2025-03-06 PROCEDURE — 3077F SYST BP >= 140 MM HG: CPT | Performed by: PHYSICIAN ASSISTANT

## 2025-03-06 PROCEDURE — 3079F DIAST BP 80-89 MM HG: CPT | Performed by: PHYSICIAN ASSISTANT

## 2025-03-06 RX ORDER — METHYLPREDNISOLONE 4 MG/1
TABLET ORAL
Qty: 21 TABLET | Refills: 0 | Status: SHIPPED | OUTPATIENT
Start: 2025-03-06

## 2025-03-06 ASSESSMENT — ENCOUNTER SYMPTOMS
BACK PAIN: 1
SENSORY CHANGE: 0
TINGLING: 0
FEVER: 1
WHEEZING: 0
COUGH: 1
FOCAL WEAKNESS: 0
SHORTNESS OF BREATH: 0
SINUS PAIN: 1
SPUTUM PRODUCTION: 1

## 2025-03-06 ASSESSMENT — FIBROSIS 4 INDEX: FIB4 SCORE: 0.51

## 2025-03-06 NOTE — PROGRESS NOTES
Subjective:   Ivette Jett  is a 50 y.o. female who presents for Other (X 4 months back pain,  /congestion in chest, fever, wet cough with,nasal congestion)      Other  This is a new problem. The current episode started in the past 7 days. Associated symptoms include congestion, coughing and a fever. Pertinent negatives include no rash.   Patient presents urgent care with multiple complaints.     She notes fairly chronic dental infection and complaints of swelling and pain once again.  Noted some improvement with antibiotic course just over a month ago but acknowledges she stopped taking medication a few days then.  States she has had a very difficult time establishing with a dentist that can help her with dental extraction.  This is a chronic problem.  She thinks recent fever may be associated.    Additionally patient notes around 10 days since onset of cough and congestion.  Has had congestion in both the sinuses as well as to chest.  Complains of purulent nasal drainage and sinus congestion.  Denies ear pain.  Patient notes cough that can be productive of phlegm and other times dry.    Additionally patient complains of low back pain.  Denies numbness tingling or weakness.  Notes this is a chronic problem.  She has seen doctors for this in the past.  She is interested in establishing with primary care to further discuss options.    Review of Systems   Constitutional:  Positive for fever.   HENT:  Positive for congestion and sinus pain. Negative for ear pain.         Dental pain and swelling   Respiratory:  Positive for cough and sputum production. Negative for shortness of breath and wheezing.    Musculoskeletal:  Positive for back pain.   Skin:  Negative for rash.   Neurological:  Negative for tingling, sensory change and focal weakness.       No Known Allergies     Objective:   BP (!) 140/88 (BP Location: Right arm, Patient Position: Sitting, BP Cuff Size: Adult)   Pulse 87   Temp (!) 38.1 °C  "(100.5 °F) (Temporal)   Resp 18   Ht 1.626 m (5' 4\")   Wt 56.7 kg (125 lb)   SpO2 95%   BMI 21.46 kg/m²     Physical Exam  Vitals and nursing note reviewed.   Constitutional:       General: She is not in acute distress.     Appearance: She is well-developed. She is not diaphoretic.   HENT:      Head: Normocephalic and atraumatic.      Right Ear: Tympanic membrane, ear canal and external ear normal.      Left Ear: Tympanic membrane, ear canal and external ear normal.      Nose: Nose normal.      Mouth/Throat:      Mouth: Mucous membranes are moist.      Dentition: Abnormal dentition. Dental tenderness and dental caries (extensive) present. No gingival swelling or dental abscesses.      Pharynx: Uvula midline. Posterior oropharyngeal erythema ( mild PND) present. No oropharyngeal exudate.      Tonsils: No tonsillar abscesses.   Eyes:      General: Lids are normal. No scleral icterus.        Right eye: No discharge.         Left eye: No discharge.      Conjunctiva/sclera: Conjunctivae normal.   Pulmonary:      Effort: Pulmonary effort is normal. No respiratory distress.      Breath sounds: Normal breath sounds. No stridor. No decreased breath sounds, wheezing, rhonchi or rales.   Musculoskeletal:         General: Normal range of motion.      Cervical back: Neck supple.      Lumbar back: Spasms and tenderness ( primary paraspinous) present. No swelling, edema, deformity, signs of trauma, lacerations or bony tenderness. Negative right straight leg raise test and negative left straight leg raise test.        Back:    Skin:     General: Skin is warm and dry.      Coloration: Skin is not pale.      Findings: No erythema.   Neurological:      Mental Status: She is alert and oriented to person, place, and time. She is not disoriented.      Sensory: No sensory deficit.      Coordination: Coordination normal.      Deep Tendon Reflexes: Reflexes are normal and symmetric.      Comments: SLR normal bilat   Psychiatric:        "  Speech: Speech normal.         Behavior: Behavior normal.         Assessment/Plan:   1. Chronic dental infection  - amoxicillin-clavulanate (AUGMENTIN) 875-125 MG Tab; Take 1 Tablet by mouth 2 times a day for 7 days.  Dispense: 14 Tablet; Refill: 0  - Referral to Dentistry    2. Chronic midline low back pain without sciatica  - methylPREDNISolone (MEDROL DOSEPAK) 4 MG Tablet Therapy Pack; Follow schedule on package instructions.  Dispense: 21 Tablet; Refill: 0  - Referral to establish with PCP  Supportive care is reviewed with patient/caregiver - recommend to push PO fluids and electrolytes, Cautioned regarding potential side effects of steroid, avoid nsaids while using  /The importance of establishing with primary care referral was placed today.  Stressed the importance of follow-up with dentist and referral was placed today.   take full course of Rx, take with probiotics, observe for resolution  Return to clinic with lack of resolution or progression of symptoms.      I have worn an N95 mask, gloves and eye protection for the entire encounter with this patient.     Differential diagnosis, natural history, supportive care, and indications for immediate follow-up discussed.

## 2025-04-29 ENCOUNTER — TELEPHONE (OUTPATIENT)
Dept: HEALTH INFORMATION MANAGEMENT | Facility: OTHER | Age: 50
End: 2025-04-29
Payer: COMMERCIAL

## 2025-06-18 ENCOUNTER — OFFICE VISIT (OUTPATIENT)
Dept: URGENT CARE | Facility: CLINIC | Age: 50
End: 2025-06-18
Payer: COMMERCIAL

## 2025-06-18 VITALS
SYSTOLIC BLOOD PRESSURE: 160 MMHG | TEMPERATURE: 97.8 F | HEIGHT: 65 IN | DIASTOLIC BLOOD PRESSURE: 102 MMHG | HEART RATE: 84 BPM | WEIGHT: 125 LBS | RESPIRATION RATE: 16 BRPM | OXYGEN SATURATION: 94 % | BODY MASS INDEX: 20.83 KG/M2

## 2025-06-18 DIAGNOSIS — K04.7 DENTAL INFECTION: Primary | ICD-10-CM

## 2025-06-18 DIAGNOSIS — M54.50 CHRONIC BILATERAL LOW BACK PAIN WITHOUT SCIATICA: ICD-10-CM

## 2025-06-18 DIAGNOSIS — G89.29 CHRONIC BILATERAL LOW BACK PAIN WITHOUT SCIATICA: ICD-10-CM

## 2025-06-18 PROCEDURE — 3077F SYST BP >= 140 MM HG: CPT | Performed by: PHYSICIAN ASSISTANT

## 2025-06-18 PROCEDURE — 3080F DIAST BP >= 90 MM HG: CPT | Performed by: PHYSICIAN ASSISTANT

## 2025-06-18 PROCEDURE — RXMED WILLOW AMBULATORY MEDICATION CHARGE: Performed by: PHYSICIAN ASSISTANT

## 2025-06-18 PROCEDURE — 99214 OFFICE O/P EST MOD 30 MIN: CPT | Performed by: PHYSICIAN ASSISTANT

## 2025-06-18 RX ORDER — IBUPROFEN 800 MG/1
800 TABLET, FILM COATED ORAL EVERY 8 HOURS PRN
Qty: 30 TABLET | Refills: 0 | Status: SHIPPED | OUTPATIENT
Start: 2025-06-18

## 2025-06-18 ASSESSMENT — ENCOUNTER SYMPTOMS
BACK PAIN: 1
DIZZINESS: 0
MYALGIAS: 1
NAUSEA: 0
HEADACHES: 0
VOMITING: 0
DIAPHORESIS: 0
CHILLS: 0
SORE THROAT: 0
BLOOD IN STOOL: 0
CONSTIPATION: 0
ROS GI COMMENTS: RIGHT INGUINAL HERNIA
FEVER: 0
ABDOMINAL PAIN: 0

## 2025-06-18 ASSESSMENT — FIBROSIS 4 INDEX: FIB4 SCORE: 0.51

## 2025-06-18 NOTE — PROGRESS NOTES
Subjective:     CHIEF COMPLAINT  Chief Complaint   Patient presents with    Abdominal Pain     hernia    Dental Pain    Low Back Pain       HPI  Ivette Jett is a very pleasant 50 y.o. female who presents to the clinic with acute on chronic dental pain.  Patient has a history of multiple recurrent dental infections due to poor dentition.  States she has follow-up with dentistry next week which she has been waiting on for multiple months.  Currently experiencing pain predominantly to her right mandibular posterior molars.  Describes a dull throbbing sensation.  Also experiencing hot and cold sensitivity.  Denies any fevers, chills, nausea or vomiting.  She would also like to discuss her hernia that she has had for multiple months to her right inguinal region.  Has previously had a referral placed for this however has not had the information to reach out to schedule.  Has been having normal bowel movements.  No overlying skin changes.  Also has been experiencing chronic bilateral lumbar pain for multiple years.  No radicular symptoms to lower extremities.  No change in bowel or bladder function.    REVIEW OF SYSTEMS  Review of Systems   Constitutional:  Negative for chills, diaphoresis, fever and malaise/fatigue.   HENT:  Negative for sore throat.         Dental pain   Gastrointestinal:  Negative for abdominal pain, blood in stool, constipation, melena, nausea and vomiting.        Right inguinal hernia   Musculoskeletal:  Positive for back pain and myalgias.   Neurological:  Negative for dizziness and headaches.       PAST MEDICAL HISTORY  There are no active problems to display for this patient.      SURGICAL HISTORY  patient denies any surgical history    ALLERGIES  Allergies[1]    CURRENT MEDICATIONS  Home Medications       Reviewed by Harley Gibbons P.A.-C. (Physician Assistant) on 06/18/25 at 0922  Med List Status: <None>     Medication Last Dose Status        Patient Armand Taking any Medications       "                     SOCIAL HISTORY  Social History     Tobacco Use    Smoking status: Every Day     Current packs/day: 0.25     Types: Cigarettes    Smokeless tobacco: Never   Vaping Use    Vaping status: Never Used   Substance and Sexual Activity    Alcohol use: Yes     Comment: whisky or vodka to ease thedental pain, occ    Drug use: Not Currently     Types: Inhaled, Methamphetamines, Marijuana    Sexual activity: Yes     Partners: Male       FAMILY HISTORY  History reviewed. No pertinent family history.       Objective:     VITAL SIGNS: BP (!) 160/102   Pulse 84   Temp 36.6 °C (97.8 °F) (Temporal)   Resp 16   Ht 1.638 m (5' 4.5\")   Wt 56.7 kg (125 lb)   SpO2 94%   BMI 21.12 kg/m²     PHYSICAL EXAM  Physical Exam  Constitutional:       General: She is not in acute distress.     Appearance: Normal appearance. She is not ill-appearing, toxic-appearing or diaphoretic.   HENT:      Head: Normocephalic and atraumatic.      Right Ear: Tympanic membrane, ear canal and external ear normal.      Left Ear: Tympanic membrane, ear canal and external ear normal.      Nose: Nose normal. No congestion or rhinorrhea.      Mouth/Throat:      Mouth: Mucous membranes are moist.      Pharynx: No oropharyngeal exudate or posterior oropharyngeal erythema.      Comments: Multiple dental caries and poor dentition.  No signs of dental abscess.  No significant gingival swelling noted.  Eyes:      Conjunctiva/sclera: Conjunctivae normal.   Cardiovascular:      Rate and Rhythm: Normal rate and regular rhythm.      Pulses: Normal pulses.      Heart sounds: Normal heart sounds.   Pulmonary:      Effort: Pulmonary effort is normal.      Breath sounds: Normal breath sounds. No wheezing, rhonchi or rales.   Abdominal:      Palpations: Abdomen is soft.      Tenderness: There is no abdominal tenderness. There is no guarding or rebound.      Comments: Reducible right inguinal hernia.  No overlying skin redness.  Nontender.   Musculoskeletal: "      Cervical back: Normal range of motion. No muscular tenderness.   Lymphadenopathy:      Cervical: No cervical adenopathy.   Skin:     General: Skin is warm and dry.   Neurological:      Mental Status: She is alert.   Psychiatric:         Mood and Affect: Mood normal.         Thought Content: Thought content normal.         Assessment/Plan:     1. Dental infection  - amoxicillin-clavulanate (AUGMENTIN) 875-125 MG Tab; Take 1 Tablet by mouth 2 times a day for 7 days.  Dispense: 14 Tablet; Refill: 0    2. Chronic bilateral low back pain without sciatica  - ibuprofen (MOTRIN) 800 MG Tab; Take 1 Tablet by mouth every 8 hours as needed for Inflammation or Moderate Pain.  Dispense: 30 Tablet; Refill: 0      MDM/Comments:    Course of Augmentin provided for worsening dental pain.  Fortunately she does have follow-up with dentistry next week for definitive management.  Patient also has a right inguinal hernia that is uncomplicated.  No signs of strangulation or incarceration.  Prior referral information was provided for the patient so that she can schedule.  Regarding chronic low back pain trial of ibuprofen with food discussed.  Has pending follow-up with a PCP for ongoing management.    Differential diagnosis, natural history, supportive care, and indications for immediate follow-up discussed. All questions answered. Patient agrees with the plan of care.    Follow-up as needed if symptoms worsen or fail to improve to PCP, Urgent care or Emergency Room.    I have personally reviewed prior external notes and test results pertinent to today's visit.  I have independently reviewed and interpreted all diagnostics ordered during this urgent care acute visit.   Discussed management options (risks,benefits, and alternatives to treatment). Pt expresses understanding and the treatment plan was agreed upon. Questions were encouraged and answered to pt's satisfaction.    Please note that this dictation was created using voice  recognition software. I have made a reasonable attempt to correct obvious errors, but I expect that there are errors of grammar and possibly content that I did not discover before finalizing the note.       [1] No Known Allergies

## 2025-06-19 ENCOUNTER — PHARMACY VISIT (OUTPATIENT)
Dept: PHARMACY | Facility: MEDICAL CENTER | Age: 50
End: 2025-06-19
Payer: COMMERCIAL

## 2025-07-02 ENCOUNTER — OFFICE VISIT (OUTPATIENT)
Dept: URGENT CARE | Facility: CLINIC | Age: 50
End: 2025-07-02
Payer: COMMERCIAL

## 2025-07-02 VITALS
SYSTOLIC BLOOD PRESSURE: 130 MMHG | HEART RATE: 92 BPM | DIASTOLIC BLOOD PRESSURE: 84 MMHG | RESPIRATION RATE: 20 BRPM | WEIGHT: 125 LBS | OXYGEN SATURATION: 98 % | BODY MASS INDEX: 21.34 KG/M2 | TEMPERATURE: 97.5 F | HEIGHT: 64 IN

## 2025-07-02 DIAGNOSIS — G89.29 CHRONIC BILATERAL LOW BACK PAIN WITHOUT SCIATICA: ICD-10-CM

## 2025-07-02 DIAGNOSIS — M54.50 CHRONIC BILATERAL LOW BACK PAIN WITHOUT SCIATICA: ICD-10-CM

## 2025-07-02 DIAGNOSIS — K04.7 CHRONIC DENTAL INFECTION: ICD-10-CM

## 2025-07-02 PROCEDURE — 99213 OFFICE O/P EST LOW 20 MIN: CPT | Performed by: NURSE PRACTITIONER

## 2025-07-02 PROCEDURE — 3075F SYST BP GE 130 - 139MM HG: CPT | Performed by: NURSE PRACTITIONER

## 2025-07-02 PROCEDURE — 3079F DIAST BP 80-89 MM HG: CPT | Performed by: NURSE PRACTITIONER

## 2025-07-02 ASSESSMENT — FIBROSIS 4 INDEX: FIB4 SCORE: 0.51

## 2025-07-02 NOTE — PROGRESS NOTES
"Chief Complaint   Patient presents with    Dental Pain     Hx of hernia, low back pain, checking on referral, needs referral for PCP        HISTORY OF PRESENT ILLNESS: Patient is a pleasant 50 y.o. female who presents to urgent care today with request for referral information.  The patient has a history of chronic dental infection and low back pain.  She was seen for both of these on 6/18/2025, and urgent care.  Denies any worsening since then but is requesting referral information that was placed.  She has a  which is going to help her obtain these appointments.    There are no active problems to display for this patient.      Allergies:Patient has no known allergies.    Current Medications and Prescriptions Ordered in Epic[1]    Past Medical History[2]    Social History[3]    No family status information on file.   History reviewed. No pertinent family history.    ROS:  Review of Systems   Constitutional: Negative for fever, chills, weight loss, malaise, and fatigue.   HENT: Positive for chronic dental pain.  Negative for ear pain, nosebleeds, congestion, sore throat and neck pain.    Eyes: Negative for vision changes.   Neuro: Negative for headache, sensory changes, weakness, seizure, LOC.   Cardiovascular: Negative for chest pain, palpitations, orthopnea and leg swelling.   Respiratory: Negative for cough, sputum production, shortness of breath and wheezing.   Gastrointestinal: Negative for abdominal pain, nausea, vomiting or diarrhea.   Genitourinary: Negative for dysuria, urgency and frequency.  Musculoskeletal: Positive for chronic low back pain.  Negative for falls, neck pain, joint pain, myalgias.   Skin: Negative for rash, diaphoresis.     Exam:  /84   Pulse 92   Temp 36.4 °C (97.5 °F)   Resp 20   Ht 1.626 m (5' 4\")   Wt 56.7 kg (125 lb)   SpO2 98%   General: well-nourished, well-developed female in NAD  Head: normocephalic, atraumatic  Eyes: PERRLA, no conjunctival injection, " acuity grossly intact, lids normal.  Ears: normal shape and symmetry, no tenderness, no discharge. External canals are without any significant edema or erythema. Tympanic membranes are without any inflammation, no effusion. Gross auditory acuity is intact.  Nose: symmetrical without tenderness, no discharge.  Mouth/Throat: reasonable hygiene, no erythema, exudates or tonsillar enlargement.  Several missing teeth noted.  Neck: no masses, range of motion within normal limits, no tracheal deviation. No obvious thyroid enlargement.   Lymph: no cervical adenopathy. No supraclavicular adenopathy.   Neuro: alert and oriented. Cranial nerves 1-12 grossly intact. No sensory deficit.   Cardiovascular: regular rate and rhythm. No edema.  Pulmonary: no distress. Chest is symmetrical with respiration, no wheezes, crackles, or rhonchi.   Musculoskeletal: no clubbing, appropriate muscle tone, gait is stable.  Skin: warm, dry, intact, no clubbing, no cyanosis, no rashes.   Psych: appropriate mood, affect, judgement.         Assessment/Plan:  1. Chronic bilateral low back pain without sciatica        2. Chronic dental infection            Patient presents with chronic low back pain and chronic dental infection, without acute exacerbation.  She is requesting referral information.  Referral information is provided to patient.  I have also provided the patient with information on obtaining a PCP.  She may take previously prescribed Motrin for chronic back pain.  Supportive care, differential diagnoses, and indications for immediate follow-up discussed with patient.   Pathogenesis of diagnosis discussed including typical length and natural progression.   Instructed to return to clinic or nearest emergency department for any change in condition, further concerns, or worsening of symptoms.  Patient states understanding of the plan of care and discharge instructions.      Please note that this dictation was created using voice recognition  software. I have made every reasonable attempt to correct obvious errors, but I expect that there are errors of grammar and possibly content that I did not discover before finalizing the note.      MARITZA Pitts.          [1]   Current Outpatient Medications Ordered in Epic   Medication Sig Dispense Refill    ibuprofen (MOTRIN) 800 MG Tab Take 1 Tablet by mouth every 8 hours as needed for Inflammation or Moderate Pain. 30 Tablet 0     No current Epic-ordered facility-administered medications on file.   [2] History reviewed. No pertinent past medical history.  [3]   Social History  Tobacco Use    Smoking status: Every Day     Current packs/day: 0.25     Types: Cigarettes    Smokeless tobacco: Never   Vaping Use    Vaping status: Never Used   Substance Use Topics    Alcohol use: Yes     Comment: whisky or vodka to ease thedental pain, occ    Drug use: Not Currently     Types: Inhaled, Methamphetamines, Marijuana

## 2025-07-09 ENCOUNTER — PHARMACY VISIT (OUTPATIENT)
Dept: PHARMACY | Facility: MEDICAL CENTER | Age: 50
End: 2025-07-09
Payer: COMMERCIAL

## 2025-07-09 ENCOUNTER — OFFICE VISIT (OUTPATIENT)
Dept: URGENT CARE | Facility: CLINIC | Age: 50
End: 2025-07-09
Payer: COMMERCIAL

## 2025-07-09 VITALS
BODY MASS INDEX: 19.99 KG/M2 | WEIGHT: 120 LBS | HEIGHT: 65 IN | HEART RATE: 86 BPM | SYSTOLIC BLOOD PRESSURE: 150 MMHG | OXYGEN SATURATION: 95 % | TEMPERATURE: 98.7 F | DIASTOLIC BLOOD PRESSURE: 98 MMHG | RESPIRATION RATE: 16 BRPM

## 2025-07-09 DIAGNOSIS — K04.7 DENTAL INFECTION: Primary | ICD-10-CM

## 2025-07-09 PROCEDURE — RXMED WILLOW AMBULATORY MEDICATION CHARGE: Performed by: NURSE PRACTITIONER

## 2025-07-09 PROCEDURE — 99213 OFFICE O/P EST LOW 20 MIN: CPT | Performed by: NURSE PRACTITIONER

## 2025-07-09 PROCEDURE — 3077F SYST BP >= 140 MM HG: CPT | Performed by: NURSE PRACTITIONER

## 2025-07-09 PROCEDURE — 3080F DIAST BP >= 90 MM HG: CPT | Performed by: NURSE PRACTITIONER

## 2025-07-09 RX ORDER — IBUPROFEN 600 MG/1
600 TABLET, FILM COATED ORAL EVERY 6 HOURS PRN
Qty: 30 TABLET | Refills: 0 | Status: SHIPPED | OUTPATIENT
Start: 2025-07-09

## 2025-07-09 ASSESSMENT — FIBROSIS 4 INDEX: FIB4 SCORE: 0.51

## 2025-07-09 NOTE — PROGRESS NOTES
Subjective:     Ivette Jett is a 50 y.o. female who presents for Oral Swelling (X started yesterday L upper tooth px/ L side of L foot px, stepped on goat head, pt states it is calloused over and swollen, hurts to put pressure on it// has questions about her hernia )      Presents for upper left dental pain. States she did not finish the last prescription.     Dental Pain   This is a recurrent problem. The current episode started yesterday. The problem has been gradually worsening. The pain is moderate. Pertinent negatives include no difficulty swallowing or fever. She has tried NSAIDs for the symptoms.       Past Medical History[1]    Past Surgical History[2]    Social History     Socioeconomic History    Marital status: Single     Spouse name: Not on file    Number of children: Not on file    Years of education: Not on file    Highest education level: Not on file   Occupational History    Not on file   Tobacco Use    Smoking status: Every Day     Current packs/day: 0.25     Types: Cigarettes    Smokeless tobacco: Never   Vaping Use    Vaping status: Never Used   Substance and Sexual Activity    Alcohol use: Yes     Comment: whisky or vodka to ease thedental pain, occ    Drug use: Not Currently     Types: Inhaled, Methamphetamines, Marijuana    Sexual activity: Yes     Partners: Male   Other Topics Concern    Not on file   Social History Narrative    Not on file     Social Drivers of Health     Financial Resource Strain: Not on file   Food Insecurity: Not on file   Transportation Needs: Not on file   Physical Activity: Not on file   Stress: Not on file   Social Connections: Not on file   Intimate Partner Violence: Not on file   Housing Stability: Not on file        History reviewed. No pertinent family history.     Allergies[3]    Review of Systems   Constitutional:  Negative for fever.   Respiratory:  Negative for shortness of breath.    All other systems reviewed and are negative.       Objective:  "  BP (!) 150/98   Pulse 86   Temp 37.1 °C (98.7 °F)   Resp 16   Ht 1.638 m (5' 4.5\")   Wt 54.4 kg (120 lb)   SpO2 95%   BMI 20.28 kg/m²     Physical Exam  Vitals reviewed.   Constitutional:       General: She is not in acute distress.     Appearance: She is not toxic-appearing.   HENT:      Mouth/Throat:      Lips: Pink.      Mouth: Mucous membranes are moist.      Dentition: Abnormal dentition. Dental tenderness, gingival swelling and dental caries present.      Pharynx: Oropharynx is clear.      Comments: Multiple fractured and/or eroded teeth.   Pulmonary:      Effort: Pulmonary effort is normal.   Musculoskeletal:      Cervical back: No edema or crepitus.   Skin:     General: Skin is warm and dry.   Neurological:      Mental Status: She is alert and oriented to person, place, and time.         Assessment/Plan:   1. Dental infection  - ibuprofen (MOTRIN) 600 MG Tab; Take 1 Tablet by mouth every 6 hours as needed for Inflammation, Moderate Pain or Mild Pain.  Dispense: 30 Tablet; Refill: 0  - amoxicillin-clavulanate (AUGMENTIN) 875-125 MG Tab; Take 1 Tablet by mouth 2 times a day for 7 days.  Dispense: 14 Tablet; Refill: 0    -Oral hydration.  -Ice pack for pain and swelling.  -Tylenol and NSAIDs (Ibuprofen) for pain and inflammation.  -Dental Hygiene  -Reduce sugar-rich foods or beverages.     Follow up with dentist. Follow up urgently for worsening symptoms or increased signs of infection (redness, pus, increased pain, increased swelling or fever). Emergently for swelling to neck or throat, difficulty swallowing, difficulty opening mouth, shortness of breath.    -Symptoms are consistent with a dental infection. Clear airway. Initiated antibiotic coverage, and encourage a dental follow up.    Differential diagnosis, natural history, supportive care, and indications for immediate follow-up discussed.         [1] History reviewed. No pertinent past medical history.  [2] History reviewed. No pertinent " surgical history.  [3] No Known Allergies

## 2025-07-13 ASSESSMENT — ENCOUNTER SYMPTOMS
SHORTNESS OF BREATH: 0
FEVER: 0

## 2025-07-29 ENCOUNTER — OFFICE VISIT (OUTPATIENT)
Dept: URGENT CARE | Facility: CLINIC | Age: 50
End: 2025-07-29
Payer: COMMERCIAL

## 2025-07-29 VITALS
RESPIRATION RATE: 18 BRPM | OXYGEN SATURATION: 97 % | HEART RATE: 80 BPM | WEIGHT: 120 LBS | DIASTOLIC BLOOD PRESSURE: 62 MMHG | BODY MASS INDEX: 19.99 KG/M2 | TEMPERATURE: 98.2 F | HEIGHT: 65 IN | SYSTOLIC BLOOD PRESSURE: 110 MMHG

## 2025-07-29 DIAGNOSIS — K04.7 DENTAL INFECTION: Primary | ICD-10-CM

## 2025-07-29 ASSESSMENT — ENCOUNTER SYMPTOMS
DIARRHEA: 0
DIAPHORESIS: 0
ABDOMINAL PAIN: 0
DIZZINESS: 0
SHORTNESS OF BREATH: 0
SENSORY CHANGE: 0

## 2025-07-29 ASSESSMENT — FIBROSIS 4 INDEX: FIB4 SCORE: 0.51

## 2025-07-29 NOTE — PROGRESS NOTES
"Subjective     Ivette Jett is a 50 y.o. female who presents with Dental Pain (Lower right side of teeth are infected, patient has had amoxicillin  and still no improvement. )    Reports history of dental infections. Denies fevers, difficulty swallowing, sore throat, chills, drooling or shortness of breath. Reports has a 6 month wait with her dentist. Ibuprofen helps. No other aggravating or alleviating factors.          Review of Systems   Constitutional:  Negative for diaphoresis.   Respiratory:  Negative for shortness of breath.    Cardiovascular:  Negative for chest pain.   Gastrointestinal:  Negative for abdominal pain and diarrhea.   Neurological:  Negative for dizziness and sensory change.   All other systems reviewed and are negative.      Medications:    ibuprofen Tabs    Allergies:  Patient has no known allergies.    Past Social Hx:  Social History[1]    Past Medical Hx: Past Medical History[2]     Past Surgical Hx: Past Surgical History[3]         Objective     /62   Pulse 80   Temp 36.8 °C (98.2 °F) (Temporal)   Resp 18   Ht 1.651 m (5' 5\")   Wt 54.4 kg (120 lb)   SpO2 97%   BMI 19.97 kg/m²      Physical Exam  Constitutional:       General: She is not in acute distress.     Appearance: She is not ill-appearing.   HENT:      Mouth/Throat:      Dentition: Abnormal dentition. Dental tenderness and dental caries present. No dental abscesses.      Tongue: Tongue does not deviate from midline.      Pharynx: Uvula midline. No oropharyngeal exudate or posterior oropharyngeal erythema.      Tonsils: No tonsillar exudate or tonsillar abscesses.      Comments: No significant gingival swelling. No unilarteral oral pharynx swelling. No tonsillar swelling, bilaterally. No trismus.   Cardiovascular:      Heart sounds: Normal heart sounds.   Pulmonary:      Breath sounds: Normal breath sounds.   Musculoskeletal:      Cervical back: Normal range of motion and neck supple. No rigidity. "   Lymphadenopathy:      Cervical: No cervical adenopathy.   Skin:     General: Skin is warm and dry.      Capillary Refill: Capillary refill takes less than 2 seconds.   Neurological:      Mental Status: She is alert.              Assessment & Plan  Dental infection    Orders:    amoxicillin-clavulanate (AUGMENTIN) 875-125 MG Tab; Take 1 Tablet by mouth 2 times a day for 7 days.       VSS. Well-appearing. No dental abscess noted. Reports she lost prior prescription Augmentin that was given to her earlier this month due to moving around/living situation. Would like to stick with Augmentin as this is what she has taken in the past. May continue with otc ibuprofen.     Reasonable side affects and potential adverse effects of medication discussed.    Differential diagnosis, natural history, supportive care, management options, risks/benefits, and alternatives to treatment discussed. Questions were encouraged and answered. Patient verbalized understanding and the treatment plan was agreed upon. Advised to follow-up as needed with PCP or RTC for recheck, reevaluation, and consideration of further management. Red flags and indications for immediate care discussed. Advised to seek higher level of care through the Emergency Department for any new or worsening symptoms.     This note was electronically signed by   Asha Zee, SABRINA, APRN, FNP-BC         [1]   Social History  Socioeconomic History    Marital status: Single   Tobacco Use    Smoking status: Every Day     Current packs/day: 0.25     Types: Cigarettes    Smokeless tobacco: Never   Vaping Use    Vaping status: Never Used   Substance and Sexual Activity    Alcohol use: Yes     Comment: whisky or vodka to ease thedental pain, occ    Drug use: Not Currently     Types: Inhaled, Methamphetamines, Marijuana    Sexual activity: Yes     Partners: Male   [2] History reviewed. No pertinent past medical history.  [3] History reviewed. No pertinent surgical history.